# Patient Record
Sex: MALE | Race: WHITE | URBAN - METROPOLITAN AREA
[De-identification: names, ages, dates, MRNs, and addresses within clinical notes are randomized per-mention and may not be internally consistent; named-entity substitution may affect disease eponyms.]

---

## 2017-04-25 RX ORDER — SITAGLIPTIN 50 MG/1
TABLET, FILM COATED ORAL
Qty: 30 TABLET | Refills: 0 | OUTPATIENT
Start: 2017-04-25

## 2017-04-25 NOTE — TELEPHONE ENCOUNTER
Last OV:  1/4/2017. Return to clinic in 3 months  Suggested date of next visit: 04/04/2017  No future appointments.   Savanah Giraldo, 04/25/2017, 3:37 PM

## 2017-04-26 NOTE — TELEPHONE ENCOUNTER
Future Appointments  Date Time Provider Jovanni Nunez   5/6/2017 8:30 AM Pancho Day MD [facility]     Dr Andrea Cotton can you please send in a refill until appt. Thank you.

## 2017-05-06 ENCOUNTER — OFFICE VISIT (OUTPATIENT)
Dept: FAMILY MEDICINE CLINIC | Facility: CLINIC | Age: 47
End: 2017-05-06

## 2017-05-06 ENCOUNTER — APPOINTMENT (OUTPATIENT)
Dept: LAB | Age: 47
End: 2017-05-06
Attending: FAMILY MEDICINE
Payer: COMMERCIAL

## 2017-05-06 VITALS
RESPIRATION RATE: 16 BRPM | DIASTOLIC BLOOD PRESSURE: 96 MMHG | HEART RATE: 100 BPM | BODY MASS INDEX: 29.16 KG/M2 | WEIGHT: 210.63 LBS | SYSTOLIC BLOOD PRESSURE: 138 MMHG | HEIGHT: 71.25 IN | TEMPERATURE: 98 F

## 2017-05-06 DIAGNOSIS — Z87.891 QUIT SMOKING: ICD-10-CM

## 2017-05-06 DIAGNOSIS — R03.0 ELEVATED BLOOD PRESSURE READING WITHOUT DIAGNOSIS OF HYPERTENSION: ICD-10-CM

## 2017-05-06 DIAGNOSIS — E11.8 TYPE 2 DIABETES MELLITUS WITH COMPLICATION, WITHOUT LONG-TERM CURRENT USE OF INSULIN (HCC): ICD-10-CM

## 2017-05-06 DIAGNOSIS — E11.8 TYPE 2 DIABETES MELLITUS WITH COMPLICATION, WITHOUT LONG-TERM CURRENT USE OF INSULIN (HCC): Primary | ICD-10-CM

## 2017-05-06 PROBLEM — K76.0 FATTY METAMORPHOSIS OF LIVER: Status: ACTIVE | Noted: 2017-01-04

## 2017-05-06 PROBLEM — K40.90 INGUINAL HERNIA: Status: ACTIVE | Noted: 2017-01-04

## 2017-05-06 PROCEDURE — 83036 HEMOGLOBIN GLYCOSYLATED A1C: CPT

## 2017-05-06 PROCEDURE — 80053 COMPREHEN METABOLIC PANEL: CPT

## 2017-05-06 PROCEDURE — 99214 OFFICE O/P EST MOD 30 MIN: CPT | Performed by: FAMILY MEDICINE

## 2017-05-06 NOTE — PROGRESS NOTES
[facility] [place]  PROGRESS NOTE  Chief Complaint:   Patient presents with:  Diabetic Flu: 150s to 170s      HPI:   This is a 55year old male presents for diabetes follow-up. He has been tolerating both of his diabetes medication well.   His b 1 tablet (50 mg total) by mouth daily. Disp: 30 tablet Rfl: 0      Ready to quit: Not Answered  Counseling given: Yes         REVIEW OF SYSTEMS:   CONSTITUTIONAL:  Denies unusual weight gain/loss, fever, chills, or fatigue.    EYES:  Denies eye pain, visual pulses bilaterally. ABDOMEN: Soft, nondistended, nontender, bowel sounds normal in all 4 quadrants, no Masses, no hepatosplenomegaly. SKIN: No rashes, no skin lesion, no bruising, good turgor.   MUSCULOSKELETAL: Normal ROM, no joint pain, or muscle weakne Patient/Caregiver Education: There are no barriers to learning. Medical education done. Outcome: Patient verbalizes understanding. Patient is notified to call with any questions, complications, allergies, or worsening or changing symptoms.   Patient is to

## 2017-05-08 ENCOUNTER — TELEPHONE (OUTPATIENT)
Dept: FAMILY MEDICINE CLINIC | Facility: CLINIC | Age: 47
End: 2017-05-08

## 2017-05-08 NOTE — TELEPHONE ENCOUNTER
----- Message from Jennifer Garza MD sent at 5/6/2017  4:31 PM CDT -----  Please inform patient that his hemoglobin A1c has improved to 6.2. His diabetes is stable. Advised to continue with current medication and diet. CMP is also within normal limits.

## 2017-05-10 NOTE — TELEPHONE ENCOUNTER
Let pt know the following below. Pt verbalized his understanding and had no other questions at this time. Will be bringing in a urine sample this afternoon.

## 2017-05-23 NOTE — TELEPHONE ENCOUNTER
LOV: 5/6/17  Last refille: 5/6/17  Both scripts with 2 refills    Does pt need to come in? No future appointments.

## 2017-05-24 RX ORDER — SITAGLIPTIN 50 MG/1
TABLET, FILM COATED ORAL
Qty: 30 TABLET | Refills: 2 | Status: SHIPPED | OUTPATIENT
Start: 2017-05-24 | End: 2017-12-30

## 2017-08-16 RX ORDER — SITAGLIPTIN 50 MG/1
TABLET, FILM COATED ORAL
Qty: 30 TABLET | Refills: 0 | Status: SHIPPED | OUTPATIENT
Start: 2017-08-16 | End: 2017-08-26

## 2017-08-16 NOTE — TELEPHONE ENCOUNTER
Last Labs:  5/6/2017  Last OV:  5/6/2017  Last RF:  5/24/2017    Future Appointments  Date Time Provider Jovanni Nunez   8/26/2017 8:00 AM Fish Mathew MD [facility] [place] [facility], 08/16/17, 2:54 PM

## 2017-08-26 ENCOUNTER — LAB ENCOUNTER (OUTPATIENT)
Dept: LAB | Age: 47
End: 2017-08-26
Attending: FAMILY MEDICINE
Payer: COMMERCIAL

## 2017-08-26 ENCOUNTER — OFFICE VISIT (OUTPATIENT)
Dept: FAMILY MEDICINE CLINIC | Facility: CLINIC | Age: 47
End: 2017-08-26

## 2017-08-26 VITALS
RESPIRATION RATE: 20 BRPM | BODY MASS INDEX: 30 KG/M2 | WEIGHT: 218.81 LBS | HEART RATE: 100 BPM | TEMPERATURE: 98 F | SYSTOLIC BLOOD PRESSURE: 160 MMHG | DIASTOLIC BLOOD PRESSURE: 102 MMHG

## 2017-08-26 DIAGNOSIS — E11.8 TYPE 2 DIABETES MELLITUS WITH COMPLICATION, WITHOUT LONG-TERM CURRENT USE OF INSULIN (HCC): ICD-10-CM

## 2017-08-26 DIAGNOSIS — I10 ESSENTIAL HYPERTENSION: ICD-10-CM

## 2017-08-26 DIAGNOSIS — E11.8 TYPE 2 DIABETES MELLITUS WITH COMPLICATION, WITHOUT LONG-TERM CURRENT USE OF INSULIN (HCC): Primary | ICD-10-CM

## 2017-08-26 LAB
ALBUMIN SERPL-MCNC: 3.9 G/DL (ref 3.5–4.8)
ALP LIVER SERPL-CCNC: 60 U/L (ref 45–117)
ALT SERPL-CCNC: 64 U/L (ref 17–63)
AST SERPL-CCNC: 39 U/L (ref 15–41)
BASOPHILS # BLD AUTO: 0.06 X10(3) UL (ref 0–0.1)
BASOPHILS NFR BLD AUTO: 1 %
BILIRUB SERPL-MCNC: 0.7 MG/DL (ref 0.1–2)
BUN BLD-MCNC: 11 MG/DL (ref 8–20)
CALCIUM BLD-MCNC: 9.4 MG/DL (ref 8.3–10.3)
CHLORIDE: 104 MMOL/L (ref 101–111)
CHOLEST SMN-MCNC: 164 MG/DL (ref ?–200)
CO2: 27 MMOL/L (ref 22–32)
CREAT BLD-MCNC: 0.79 MG/DL (ref 0.7–1.3)
EOSINOPHIL # BLD AUTO: 0.22 X10(3) UL (ref 0–0.3)
EOSINOPHIL NFR BLD AUTO: 3.6 %
ERYTHROCYTE [DISTWIDTH] IN BLOOD BY AUTOMATED COUNT: 13.1 % (ref 11.5–16)
EST. AVERAGE GLUCOSE BLD GHB EST-MCNC: 131 MG/DL (ref 68–126)
GLUCOSE BLD-MCNC: 148 MG/DL (ref 70–99)
HBA1C MFR BLD HPLC: 6.2 % (ref ?–5.7)
HCT VFR BLD AUTO: 44.6 % (ref 37–53)
HDLC SERPL-MCNC: 55 MG/DL (ref 45–?)
HDLC SERPL: 2.98 {RATIO} (ref ?–4.97)
HGB BLD-MCNC: 15.5 G/DL (ref 13–17)
IMMATURE GRANULOCYTE COUNT: 0.04 X10(3) UL (ref 0–1)
IMMATURE GRANULOCYTE RATIO %: 0.6 %
LDLC SERPL CALC-MCNC: 96 MG/DL (ref ?–130)
LDLC SERPL-MCNC: 13 MG/DL (ref 5–40)
LYMPHOCYTES # BLD AUTO: 1.2 X10(3) UL (ref 0.9–4)
LYMPHOCYTES NFR BLD AUTO: 19.4 %
M PROTEIN MFR SERPL ELPH: 7.7 G/DL (ref 6.1–8.3)
MCH RBC QN AUTO: 33.5 PG (ref 27–33.2)
MCHC RBC AUTO-ENTMCNC: 34.8 G/DL (ref 31–37)
MCV RBC AUTO: 96.5 FL (ref 80–99)
MONOCYTES # BLD AUTO: 0.55 X10(3) UL (ref 0.1–0.6)
MONOCYTES NFR BLD AUTO: 8.9 %
NEUTROPHIL ABS PRELIM: 4.12 X10 (3) UL (ref 1.3–6.7)
NEUTROPHILS # BLD AUTO: 4.12 X10(3) UL (ref 1.3–6.7)
NEUTROPHILS NFR BLD AUTO: 66.5 %
NONHDLC SERPL-MCNC: 109 MG/DL (ref ?–130)
PLATELET # BLD AUTO: 208 10(3)UL (ref 150–450)
POTASSIUM SERPL-SCNC: 4.7 MMOL/L (ref 3.6–5.1)
RBC # BLD AUTO: 4.62 X10(6)UL (ref 4.3–5.7)
RED CELL DISTRIBUTION WIDTH-SD: 45.7 FL (ref 35.1–46.3)
SODIUM SERPL-SCNC: 137 MMOL/L (ref 136–144)
TRIGLYCERIDES: 67 MG/DL (ref ?–150)
TSI SER-ACNC: 2.47 MIU/ML (ref 0.35–5.5)
WBC # BLD AUTO: 6.2 X10(3) UL (ref 4–13)

## 2017-08-26 PROCEDURE — 36415 COLL VENOUS BLD VENIPUNCTURE: CPT

## 2017-08-26 PROCEDURE — 80053 COMPREHEN METABOLIC PANEL: CPT

## 2017-08-26 PROCEDURE — 85025 COMPLETE CBC W/AUTO DIFF WBC: CPT

## 2017-08-26 PROCEDURE — 84443 ASSAY THYROID STIM HORMONE: CPT

## 2017-08-26 PROCEDURE — 80061 LIPID PANEL: CPT

## 2017-08-26 PROCEDURE — 99214 OFFICE O/P EST MOD 30 MIN: CPT | Performed by: FAMILY MEDICINE

## 2017-08-26 PROCEDURE — 93000 ELECTROCARDIOGRAM COMPLETE: CPT | Performed by: FAMILY MEDICINE

## 2017-08-26 PROCEDURE — 83036 HEMOGLOBIN GLYCOSYLATED A1C: CPT

## 2017-08-26 RX ORDER — LISINOPRIL 20 MG/1
20 TABLET ORAL DAILY
Qty: 30 TABLET | Refills: 2 | Status: SHIPPED | OUTPATIENT
Start: 2017-08-26 | End: 2017-12-19

## 2017-08-26 NOTE — PROGRESS NOTES
[facility] [place]  PROGRESS NOTE  Chief Complaint:   Patient presents with:  Medication Follow-Up: 120s-140s      HPI:   This is a 52year old male with history of diabetes presents for follow up.  His glucose level at home has been ranging from MG Oral Tab Take 1 tablet (20 mg total) by mouth daily.  Disp: 30 tablet Rfl: 2   JANUVIA 50 MG Oral Tab TAKE 1 TABLET(50 MG) BY MOUTH DAILY Disp: 30 tablet Rfl: 2   METFORMIN HCL 1000 MG Oral Tab TAKE 1 TABLET(1000 MG) BY MOUTH TWICE DAILY Disp: 60 tablet nourished, no acute distress. EYES:  Sclera anicteric, conjunctiva normal, PERRLA, EOMI. LUNGS: Clear to auscultation bilterally, no rales/rhonchi/wheezing. HEART:  Regular rate and rhythm, S1 and S2 are normal, no murmurs, rubs or gallops.   EXTREMITIES on coffee. Advice low carb in diet, AVOID FOOD WITH HIGH GLYCEMIC INDEX, have smaller portion meal, no juice or soda, don't eat late at night, weight loss. Continue to monitor glucose level, call if glucose level less than 70 or more than 300.     Retur

## 2017-08-26 NOTE — PATIENT INSTRUCTIONS
Recommend to start lisinopril. Advice low salt diet and exercise. Monitor your blood pressure. Return to clinic if systolic blood pressure more than 846 or diastolic more than 242. Stop smoking and cut back on coffee.    Advice low carb in diet, AVOID F

## 2017-08-28 ENCOUNTER — TELEPHONE (OUTPATIENT)
Dept: FAMILY MEDICINE CLINIC | Facility: CLINIC | Age: 47
End: 2017-08-28

## 2017-08-28 NOTE — TELEPHONE ENCOUNTER
----- Message from Leandro Lambert MD sent at 8/28/2017  9:03 AM CDT -----  Please inform patient that his hemoglobin A1c level is 6.2, his glucose level is 148.   His diabetes is currently stable, continue with current medication and watching carb in his die

## 2017-09-12 RX ORDER — SITAGLIPTIN 50 MG/1
TABLET, FILM COATED ORAL
Qty: 30 TABLET | Refills: 1 | Status: SHIPPED | OUTPATIENT
Start: 2017-09-12 | End: 2017-11-03

## 2017-11-03 NOTE — TELEPHONE ENCOUNTER
Future appt:    Last Appointment:  8/26/2017    Cholesterol, Total (mg/dL)   Date Value   08/26/2017 164   ----------  HDL Cholesterol (mg/dL)   Date Value   08/26/2017 55   ----------  LDL Cholesterol (mg/dL)   Date Value   08/26/2017 96   ----------  Triglycerides (mg/dL)   Date Value   08/26/2017 67   ----------    Lab Results  Component Value Date    (H) 08/26/2017   A1C 6.2 (H) 08/26/2017       Lab Results  Component Value Date   TSH 2.470 08/26/2017       No Follow-up on file. Return in about 3 months (around 11/26/2017).

## 2017-11-04 RX ORDER — SITAGLIPTIN 50 MG/1
TABLET, FILM COATED ORAL
Qty: 30 TABLET | Refills: 0 | Status: SHIPPED | OUTPATIENT
Start: 2017-11-04 | End: 2017-12-30

## 2017-12-19 NOTE — TELEPHONE ENCOUNTER
Last appt 8/26/17    Future Appointments  Date Time Provider Jovanni Nunez   12/30/2017 8:20 AM Mirian Rod MD [facility] [place] [facility]

## 2017-12-20 RX ORDER — LISINOPRIL 20 MG/1
TABLET ORAL
Qty: 30 TABLET | Refills: 0 | Status: SHIPPED | OUTPATIENT
Start: 2017-12-20 | End: 2018-01-20

## 2017-12-30 ENCOUNTER — OFFICE VISIT (OUTPATIENT)
Dept: FAMILY MEDICINE CLINIC | Facility: CLINIC | Age: 47
End: 2017-12-30

## 2017-12-30 ENCOUNTER — APPOINTMENT (OUTPATIENT)
Dept: LAB | Age: 47
End: 2017-12-30
Attending: FAMILY MEDICINE
Payer: COMMERCIAL

## 2017-12-30 VITALS
DIASTOLIC BLOOD PRESSURE: 96 MMHG | SYSTOLIC BLOOD PRESSURE: 152 MMHG | TEMPERATURE: 98 F | BODY MASS INDEX: 30.07 KG/M2 | HEIGHT: 72 IN | HEART RATE: 80 BPM | WEIGHT: 222 LBS

## 2017-12-30 DIAGNOSIS — I10 ESSENTIAL HYPERTENSION: ICD-10-CM

## 2017-12-30 DIAGNOSIS — E11.8 DIABETIC COMPLICATION (HCC): ICD-10-CM

## 2017-12-30 DIAGNOSIS — Z00.00 PHYSICAL EXAM: ICD-10-CM

## 2017-12-30 DIAGNOSIS — E11.8 TYPE 2 DIABETES MELLITUS WITH COMPLICATION, WITHOUT LONG-TERM CURRENT USE OF INSULIN (HCC): Primary | ICD-10-CM

## 2017-12-30 DIAGNOSIS — E11.42 DIABETIC PERIPHERAL NEUROPATHY (HCC): ICD-10-CM

## 2017-12-30 PROCEDURE — 36415 COLL VENOUS BLD VENIPUNCTURE: CPT | Performed by: FAMILY MEDICINE

## 2017-12-30 PROCEDURE — 84443 ASSAY THYROID STIM HORMONE: CPT | Performed by: FAMILY MEDICINE

## 2017-12-30 PROCEDURE — 80053 COMPREHEN METABOLIC PANEL: CPT | Performed by: FAMILY MEDICINE

## 2017-12-30 PROCEDURE — 81003 URINALYSIS AUTO W/O SCOPE: CPT | Performed by: FAMILY MEDICINE

## 2017-12-30 PROCEDURE — 83036 HEMOGLOBIN GLYCOSYLATED A1C: CPT | Performed by: FAMILY MEDICINE

## 2017-12-30 PROCEDURE — 80061 LIPID PANEL: CPT | Performed by: FAMILY MEDICINE

## 2017-12-30 PROCEDURE — 82043 UR ALBUMIN QUANTITATIVE: CPT | Performed by: FAMILY MEDICINE

## 2017-12-30 PROCEDURE — 99396 PREV VISIT EST AGE 40-64: CPT | Performed by: FAMILY MEDICINE

## 2017-12-30 PROCEDURE — 82570 ASSAY OF URINE CREATININE: CPT | Performed by: FAMILY MEDICINE

## 2017-12-30 PROCEDURE — 85025 COMPLETE CBC W/AUTO DIFF WBC: CPT | Performed by: FAMILY MEDICINE

## 2017-12-30 RX ORDER — GABAPENTIN 300 MG/1
300 CAPSULE ORAL NIGHTLY
Qty: 30 CAPSULE | Refills: 2 | Status: SHIPPED | OUTPATIENT
Start: 2017-12-30 | End: 2018-03-25

## 2017-12-30 NOTE — PATIENT INSTRUCTIONS
Advice low carb in diet, AVOID FOOD WITH HIGH GLYCEMIC INDEX, have smaller portion meal, no juice or soda, don't eat late at night, exercise, weight loss. Continue to monitor glucose level, call if glucose level less than 70 or more than 300.     Advice l

## 2017-12-30 NOTE — PROGRESS NOTES
[facility]      HPI:   Freddie Cowan is a 52year old male who presents for an Annual Health Visit. Patient also has history of hypertension, diabetes. He has been tolerating medication well, not very compliant with diet.   Also has l oz/week     Comment: 2 beers per day    Social History Narrative    Single         REVIEW OF SYSTEMS:   GENERAL HEALTH: feels well otherwise   SKIN: denies any unusual skin lesions or rashes  EYES: no visual complaints or deficits  HEENT: denies nasal valdo bruits.   GENITAL/: penis normal; no testicular masses; no inguinal hernias    LYMPHATIC: no lymphadenopathy  MUSCULOSKELETAL: no acute synovitis upper or lower extremity  EXTREMITIES: no cyanosis, clubbing or edema, peripheral pulses intact  NEUROLOGIC: diabetes mellitus with complication, without long-term current use of insulin (HCC)     Essential hypertension     Diabetic peripheral neuropathy ([facility]      Megan Turner MD  12/30/2017  10:58 AM

## 2018-01-02 ENCOUNTER — TELEPHONE (OUTPATIENT)
Dept: FAMILY MEDICINE CLINIC | Facility: CLINIC | Age: 48
End: 2018-01-02

## 2018-01-02 NOTE — TELEPHONE ENCOUNTER
----- Message from Iain Pickett MD sent at 1/1/2018  9:27 PM CST -----  Please inform patient that his HgA1c level is 6.2, his diabetes is stable. Recommend to continue with low carb diet and exercise.    His liver enzymes are slightly elevated, recommend

## 2018-01-22 RX ORDER — LISINOPRIL 20 MG/1
TABLET ORAL
Qty: 30 TABLET | Refills: 3 | Status: SHIPPED | OUTPATIENT
Start: 2018-01-22 | End: 2018-06-01

## 2018-01-22 NOTE — TELEPHONE ENCOUNTER
Future appt:    Last Appointment:  12/30/2017 with Dr. Micah Mckay for DM / HTN / and other health issues  Return in 3 months    Cholesterol, Total (mg/dL)   Date Value   12/30/2017 185   ----------  HDL Cholesterol (mg/dL)   Date Value   12/30/2017 61   ------

## 2018-03-26 RX ORDER — SITAGLIPTIN 50 MG/1
TABLET, FILM COATED ORAL
Qty: 30 TABLET | Refills: 0 | Status: SHIPPED | OUTPATIENT
Start: 2018-03-26 | End: 2018-06-01

## 2018-03-26 RX ORDER — GABAPENTIN 300 MG/1
CAPSULE ORAL
Qty: 30 CAPSULE | Refills: 0 | Status: SHIPPED | OUTPATIENT
Start: 2018-03-26 | End: 2018-06-01

## 2018-04-23 RX ORDER — GABAPENTIN 300 MG/1
CAPSULE ORAL
Qty: 30 CAPSULE | Refills: 0 | OUTPATIENT
Start: 2018-04-23

## 2018-04-23 RX ORDER — SITAGLIPTIN 50 MG/1
TABLET, FILM COATED ORAL
Qty: 30 TABLET | Refills: 0 | OUTPATIENT
Start: 2018-04-23

## 2018-05-21 RX ORDER — LISINOPRIL 20 MG/1
TABLET ORAL
Qty: 30 TABLET | Refills: 0 | OUTPATIENT
Start: 2018-05-21

## 2018-06-01 ENCOUNTER — TELEPHONE (OUTPATIENT)
Dept: FAMILY MEDICINE CLINIC | Facility: CLINIC | Age: 48
End: 2018-06-01

## 2018-06-01 RX ORDER — LISINOPRIL 20 MG/1
TABLET ORAL
Qty: 30 TABLET | Refills: 0 | Status: SHIPPED | OUTPATIENT
Start: 2018-06-01 | End: 2018-06-27

## 2018-06-01 RX ORDER — GABAPENTIN 300 MG/1
CAPSULE ORAL
Qty: 30 CAPSULE | Refills: 0 | Status: SHIPPED | OUTPATIENT
Start: 2018-06-01 | End: 2018-06-27

## 2018-06-01 NOTE — TELEPHONE ENCOUNTER
Future Appointments  Date Time Provider Jovanni Mayra   6/9/2018 8:20 AM Milan Munoz MD [facility] [place] [facility] [place]      Return in about 3 months (around 3/30/2018).

## 2018-06-01 NOTE — TELEPHONE ENCOUNTER
Pt states that metformin, lisinopril, gabapentin, genuvia prescriptions need to be refilled. Pt scheduled to see Dr Riana Swain on 6/9.

## 2018-06-09 ENCOUNTER — OFFICE VISIT (OUTPATIENT)
Dept: FAMILY MEDICINE CLINIC | Facility: CLINIC | Age: 48
End: 2018-06-09

## 2018-06-09 VITALS
DIASTOLIC BLOOD PRESSURE: 92 MMHG | HEART RATE: 102 BPM | RESPIRATION RATE: 16 BRPM | HEIGHT: 72 IN | BODY MASS INDEX: 30.1 KG/M2 | TEMPERATURE: 99 F | WEIGHT: 222.25 LBS | SYSTOLIC BLOOD PRESSURE: 148 MMHG

## 2018-06-09 DIAGNOSIS — I10 ESSENTIAL HYPERTENSION: ICD-10-CM

## 2018-06-09 DIAGNOSIS — E11.8 TYPE 2 DIABETES MELLITUS WITH COMPLICATION, WITHOUT LONG-TERM CURRENT USE OF INSULIN (HCC): Primary | ICD-10-CM

## 2018-06-09 DIAGNOSIS — E11.42 DIABETIC PERIPHERAL NEUROPATHY (HCC): ICD-10-CM

## 2018-06-09 PROBLEM — R03.0 ELEVATED BLOOD PRESSURE READING WITHOUT DIAGNOSIS OF HYPERTENSION: Status: RESOLVED | Noted: 2017-05-06 | Resolved: 2018-06-09

## 2018-06-09 PROCEDURE — 99214 OFFICE O/P EST MOD 30 MIN: CPT | Performed by: FAMILY MEDICINE

## 2018-06-09 RX ORDER — HYDROCHLOROTHIAZIDE 12.5 MG/1
12.5 TABLET ORAL EVERY MORNING
Qty: 30 TABLET | Refills: 2 | Status: SHIPPED | OUTPATIENT
Start: 2018-06-09 | End: 2018-08-29

## 2018-06-09 NOTE — PROGRESS NOTES
[address]  PROGRESS NOTE  Chief Complaint:   Patient presents with: Follow - Up      HPI:   This is a 52year old male with history of diabetes, hypertension and diabetic peripheral neuropathy presents for follow-up.   Patient has been mg/dl   Bilirubin Urine Negative Negative   Ketones Urine Negative Negative mg/dL   Blood Urine Negative Negative   pH Urine 5.0 4.5 - 8.0   Protein Urine Negative Negative mg/dl   Urobilinogen Urine <2.0 0.2 - 2.0 mg/dL   Nitrite Urine Negative Negative Current Meds:    Current Outpatient Prescriptions:  hydrochlorothiazide 12.5 MG Oral Tab Take 1 tablet (12.5 mg total) by mouth every morning.  Disp: 30 tablet Rfl: 2   SITagliptin Phosphate (JANUVIA) 50 MG Oral Tab TAKE 1 TABLET(50 MG) BY MOUTH DAILY Dis Estimated body mass index is 30.14 kg/m² as calculated from the following:    Height as of this encounter: 72\". Weight as of this encounter: 222 lb 4 oz. Vital signs reviewed.   Physical Exam:  GEN:  Patient is alert, awake and oriented, well develope Advice low carb in diet, AVOID FOOD WITH HIGH GLYCEMIC INDEX, have smaller portion meal, no juice or soda, don't eat late at night, exercise, weight loss. Check labs at [facility].   Also recommend to see podiatrist for shoe insert.          Talisha Howell 054

## 2018-06-09 NOTE — PATIENT INSTRUCTIONS
Continue current medications   Start HCTZ in morning and lisinopril in evening. Advice low salt diet and exercise. Monitor your blood pressure. Return to clinic if systolic blood pressure more than 979 or diastolic more than 745.    Advice low carb in die

## 2018-06-11 ENCOUNTER — TELEPHONE (OUTPATIENT)
Dept: FAMILY MEDICINE CLINIC | Facility: CLINIC | Age: 48
End: 2018-06-11

## 2018-06-11 NOTE — TELEPHONE ENCOUNTER
Patient notified of lab results and Dr's instructions. Appointment scheduled with Dr. Christa Fernandez for September.

## 2018-06-11 NOTE — TELEPHONE ENCOUNTER
----- Message from Mayra Cisneros MD sent at 6/11/2018  8:42 AM CDT -----  Please inform patient that his hemoglobin A1c level is 6.8 which has gone up from 6.2 last time. He needs we will better control of his diabetes.   Recommend to watch carb very close

## 2018-06-27 RX ORDER — GABAPENTIN 300 MG/1
CAPSULE ORAL
Qty: 90 CAPSULE | Refills: 0 | Status: SHIPPED | OUTPATIENT
Start: 2018-06-27 | End: 2018-08-29

## 2018-06-27 RX ORDER — SITAGLIPTIN 50 MG/1
TABLET, FILM COATED ORAL
Qty: 90 TABLET | Refills: 0 | Status: SHIPPED | OUTPATIENT
Start: 2018-06-27 | End: 2018-09-26

## 2018-06-27 RX ORDER — LISINOPRIL 20 MG/1
TABLET ORAL
Qty: 90 TABLET | Refills: 0 | Status: SHIPPED | OUTPATIENT
Start: 2018-06-27 | End: 2018-09-26

## 2018-06-27 NOTE — TELEPHONE ENCOUNTER
Future Appointments  Date Time Provider Jovanni Mayra   9/15/2018 8:00 AM Claudell Public, MD [facility] [place] [facility] [place]      Return in about 3 months (around 9/9/2018).

## 2018-06-27 NOTE — TELEPHONE ENCOUNTER
Future Appointments  Date Time Provider [facility]ra   9/15/2018 8:00 AM Lucy Hill MD [facility] [place] [facility] [place]      Return in about 3 months (around 9/9/2018).

## 2018-08-29 RX ORDER — HYDROCHLOROTHIAZIDE 12.5 MG/1
TABLET ORAL
Qty: 30 TABLET | Refills: 0 | Status: SHIPPED | OUTPATIENT
Start: 2018-08-29 | End: 2018-09-26

## 2018-08-29 RX ORDER — GABAPENTIN 300 MG/1
CAPSULE ORAL
Qty: 90 CAPSULE | Refills: 0 | Status: SHIPPED | OUTPATIENT
Start: 2018-08-29 | End: 2019-01-30

## 2018-08-29 NOTE — TELEPHONE ENCOUNTER
Future Appointments  Date Time Provider Jovanni Mayra   9/15/2018 8:00 AM Arturo Shanks MD [facility] [place] [facility] [place]      Return in about 3 months (around 9/9/2018).

## 2018-08-29 NOTE — TELEPHONE ENCOUNTER
Future appt:     Your appointments     Date & Time Appointment Department [facility])    Sep 15, 2018  8:00 AM CDT Exam - Established Patient with Rishabh Shultz MD [address] [place] [address] ([facility])        Ble Mcghee

## 2018-09-15 ENCOUNTER — OFFICE VISIT (OUTPATIENT)
Dept: FAMILY MEDICINE CLINIC | Facility: CLINIC | Age: 48
End: 2018-09-15
Payer: COMMERCIAL

## 2018-09-15 VITALS
OXYGEN SATURATION: 99 % | RESPIRATION RATE: 20 BRPM | BODY MASS INDEX: 30.34 KG/M2 | TEMPERATURE: 97 F | WEIGHT: 224 LBS | SYSTOLIC BLOOD PRESSURE: 124 MMHG | HEIGHT: 72 IN | DIASTOLIC BLOOD PRESSURE: 86 MMHG | HEART RATE: 91 BPM

## 2018-09-15 DIAGNOSIS — E11.8 TYPE 2 DIABETES MELLITUS WITH COMPLICATION, WITHOUT LONG-TERM CURRENT USE OF INSULIN (HCC): Primary | ICD-10-CM

## 2018-09-15 DIAGNOSIS — I10 ESSENTIAL HYPERTENSION: ICD-10-CM

## 2018-09-15 PROCEDURE — 99214 OFFICE O/P EST MOD 30 MIN: CPT | Performed by: FAMILY MEDICINE

## 2018-09-15 NOTE — PROGRESS NOTES
[facility] [place]  PROGRESS NOTE  Chief Complaint:   Patient presents with:  Medication Follow-Up      HPI:   This is a 50year old male with history of hypertension and diabetes presents for follow-up.     Has  been compliant with taking medica METFORMIN HCL 1000 MG Oral Tab TAKE 1 TABLET(1000 MG) BY MOUTH TWICE DAILY Disp: 180 tablet Rfl: 0   Glucose Blood (ACCU-CHEK SMARTVIEW) In Vitro Strip As directed  Disp:  Rfl:       Ready to quit: No  Counseling given: Yes         REVIEW OF SYSTEMS:   CON HEART:  Regular rate and rhythm, S1 and S2 are normal, no murmurs, rubs or gallops. EXTREMITIES: No edema, no cyanosis, no clubbing, FROM, 2+ dorsalis pedis pulses bilaterally.   ABDOMEN: Soft, nondistended, nontender, bowel sounds normal in all 4 quadrant Carbohydrates are the main source of fuel for the body. Carbohydrates raise blood sugar. Many people think carbohydrates are only found in pasta or bread.  But carbohydrates are actually in many kinds of foods:  · Sugars occur naturally in foods such as fru · Saturated fats are found in animal products, such as meat, poultry, whole milk, lard, and butter. Saturated fats raise LDL cholesterol and are not healthy for your heart.   · Hydrogenated oils and trans fats are formed when vegetable oils are processed in Fatty metamorphosis of liver     Inguinal hernia     Type 2 diabetes mellitus with complication, without long-term current use of insulin (HCC)     Essential hypertension     Diabetic peripheral neuropathy (HCC)      Megan Turner MD

## 2018-09-15 NOTE — PATIENT INSTRUCTIONS
Continue current medications  Advice low carb in diet, AVOID FOOD WITH HIGH GLYCEMIC INDEX, have smaller portion meal, no juice or soda, don't eat late at night, exercise, weight loss.    Continue to monitor glucose level, call if glucose level less than 70 it can raise blood cholesterol, increasing the risk of heart disease. Fat is also high in calories, which can cause weight gain. Not all types of fat are the same.   More Healthy:  · Monounsaturated fats are mostly found in vegetable oils, such as olive, [place] [address]. All rights reserved. This information is not intended as a substitute for professional medical care. Always follow your healthcare professional's instructions.

## 2018-09-16 LAB
ALBUMIN/GLOBULIN RATIO: 1.5 (CALC) (ref 1–2.5)
ALBUMIN: 4.5 G/DL (ref 3.6–5.1)
ALKALINE PHOSPHATASE: 56 U/L (ref 40–115)
ALT: 64 U/L (ref 9–46)
AST: 48 U/L (ref 10–40)
BILIRUBIN, TOTAL: 0.8 MG/DL (ref 0.2–1.2)
BUN: 12 MG/DL (ref 7–25)
CALCIUM: 9.9 MG/DL (ref 8.6–10.3)
CARBON DIOXIDE: 24 MMOL/L (ref 20–32)
CHLORIDE: 99 MMOL/L (ref 98–110)
CREATININE, RANDOM URINE: 115 MG/DL (ref 20–320)
CREATININE: 0.81 MG/DL (ref 0.6–1.35)
EGFR IF AFRICN AM: 122 ML/MIN/1.73M2
EGFR IF NONAFRICN AM: 105 ML/MIN/1.73M2
GLOBULIN: 3 G/DL (CALC) (ref 1.9–3.7)
GLUCOSE: 165 MG/DL (ref 65–99)
HEMOGLOBIN A1C: 7.3 % OF TOTAL HGB
MICROALBUMIN/CREATININE RATIO, RANDOM URINE: 6 MCG/MG CREAT
MICROALBUMIN: 0.7 MG/DL
POTASSIUM: 4.3 MMOL/L (ref 3.5–5.3)
PROTEIN, TOTAL: 7.5 G/DL (ref 6.1–8.1)
SODIUM: 134 MMOL/L (ref 135–146)

## 2018-09-17 ENCOUNTER — TELEPHONE (OUTPATIENT)
Dept: FAMILY MEDICINE CLINIC | Facility: CLINIC | Age: 48
End: 2018-09-17

## 2018-09-17 NOTE — TELEPHONE ENCOUNTER
I needs to see him also, patient has Kaya Souza, will order labs when he comes in. [facility]

## 2018-09-17 NOTE — TELEPHONE ENCOUNTER
Patient has already set up appt. No other questions at this time.    Future Appointments   Date Time Provider Jovanni Nunez   12/22/2018  8:20 AM Yadi Mesa MD [facility] [place] [facility]

## 2018-09-17 NOTE — TELEPHONE ENCOUNTER
Patient notified of lab results and Dr. Aldair Francois instructions. Fasting lab appt scheduled in 3 months. Dr. Mark Jones did you need to see him in the office in 3 months or just a lab appointment?

## 2018-09-17 NOTE — TELEPHONE ENCOUNTER
----- Message from Avtar Hood MD sent at 9/17/2018 10:06 AM CDT -----  Please inform patient that his hemoglobin A1c is 7.3 which has increased slightly from last time which was 6.8.   Recommend slightly better control of his diabetes, watch carb closely

## 2018-09-26 RX ORDER — HYDROCHLOROTHIAZIDE 12.5 MG/1
TABLET ORAL
Qty: 30 TABLET | Refills: 2 | Status: SHIPPED | OUTPATIENT
Start: 2018-09-26 | End: 2019-01-30

## 2018-09-26 RX ORDER — SITAGLIPTIN 50 MG/1
TABLET, FILM COATED ORAL
Qty: 90 TABLET | Refills: 0 | Status: SHIPPED | OUTPATIENT
Start: 2018-09-26 | End: 2019-01-30

## 2018-09-26 RX ORDER — LISINOPRIL 20 MG/1
TABLET ORAL
Qty: 90 TABLET | Refills: 0 | Status: SHIPPED | OUTPATIENT
Start: 2018-09-26 | End: 2019-01-30

## 2018-09-26 NOTE — TELEPHONE ENCOUNTER
Future appt:     Your appointments     Date & Time Appointment Department [facility])    Dec 22, 2018  8:20 AM CST Follow up with Ally Jade MD [address] [facility], [place] ([facility])        [address] [facility]

## 2018-09-26 NOTE — TELEPHONE ENCOUNTER
Future Appointments   Date Time Provider Jovanni Mayra   12/22/2018  8:20 AM Jac Lama MD [facility] [place] [facility] [place]      Return in about 3 months (around 12/15/2018) for follow up.

## 2018-10-23 RX ORDER — HYDROCHLOROTHIAZIDE 12.5 MG/1
TABLET ORAL
Qty: 30 TABLET | Refills: 0 | OUTPATIENT
Start: 2018-10-23

## 2018-10-23 NOTE — TELEPHONE ENCOUNTER
Received e-scribe refill request for hydrochlorothiazide. Dr. Narvaez Prior prescribed it 9/26/18 for #30 with 2 refills. Pt should not need refill at this time. Request denied with the above notation.

## 2018-12-23 NOTE — PATIENT INSTRUCTIONS
Continue current medications   Needs better control of diabetes. Advice low salt diet and exercise. Monitor your blood pressure. Return to clinic if systolic blood pressure more than 707 or diastolic more than 90. Cut back on coffee.    Discuss quit smo Airway patent,

## 2019-02-04 NOTE — TELEPHONE ENCOUNTER
Patient is due for physical and follow up appointment. Recommend to schedule appointment with me for refill. Med refill declined.

## 2019-02-04 NOTE — TELEPHONE ENCOUNTER
Future appt:     Last Appointment:  9/15/2018; Return in about 3 months (around 12/15/2018) for follow up.      Cholesterol, Total (mg/dL)   Date Value   12/30/2017 185     HDL Cholesterol (mg/dL)   Date Value   12/30/2017 61     LDL Cholesterol (mg/dL)   D

## 2019-02-06 RX ORDER — HYDROCHLOROTHIAZIDE 12.5 MG/1
TABLET ORAL
Qty: 90 TABLET | Refills: 0 | Status: SHIPPED | OUTPATIENT
Start: 2019-02-06 | End: 2019-05-22

## 2019-02-06 RX ORDER — SITAGLIPTIN 50 MG/1
TABLET, FILM COATED ORAL
Qty: 90 TABLET | Refills: 0 | Status: SHIPPED | OUTPATIENT
Start: 2019-02-06 | End: 2019-06-01

## 2019-02-06 RX ORDER — GABAPENTIN 300 MG/1
CAPSULE ORAL
Qty: 90 CAPSULE | Refills: 0 | Status: SHIPPED | OUTPATIENT
Start: 2019-02-06 | End: 2019-09-21

## 2019-02-06 RX ORDER — LISINOPRIL 20 MG/1
TABLET ORAL
Qty: 90 TABLET | Refills: 0 | Status: SHIPPED | OUTPATIENT
Start: 2019-02-06 | End: 2020-03-18

## 2019-02-06 NOTE — TELEPHONE ENCOUNTER
Pt notified that he is overdue for an appt. Pt scheduled appt but does not have enough meds until the appt. Please advise refills.     Future Appointments   Date Time Provider Jovnani Nunez   2/23/2019  8:20 AM Olive Marcus MD [facility] [place] [facility] [place]

## 2019-02-23 ENCOUNTER — OFFICE VISIT (OUTPATIENT)
Dept: FAMILY MEDICINE CLINIC | Facility: CLINIC | Age: 49
End: 2019-02-23
Payer: COMMERCIAL

## 2019-02-23 VITALS
HEIGHT: 72 IN | HEART RATE: 98 BPM | WEIGHT: 224.81 LBS | DIASTOLIC BLOOD PRESSURE: 78 MMHG | RESPIRATION RATE: 16 BRPM | BODY MASS INDEX: 30.45 KG/M2 | TEMPERATURE: 98 F | SYSTOLIC BLOOD PRESSURE: 118 MMHG | OXYGEN SATURATION: 100 %

## 2019-02-23 DIAGNOSIS — E11.8 TYPE 2 DIABETES MELLITUS WITH COMPLICATION, WITHOUT LONG-TERM CURRENT USE OF INSULIN (HCC): Primary | ICD-10-CM

## 2019-02-23 DIAGNOSIS — I10 ESSENTIAL HYPERTENSION: ICD-10-CM

## 2019-02-23 PROCEDURE — 99214 OFFICE O/P EST MOD 30 MIN: CPT | Performed by: FAMILY MEDICINE

## 2019-02-23 NOTE — PROGRESS NOTES
[address]  PROGRESS NOTE  Chief Complaint:   Patient presents with: Follow - Up: med check      HPI:   This is a 50year old male with hx of diabetes and htn present for follow up.      Has  been compliant with taking medications on reg Disp: 180 tablet Rfl: 0   Glucose Blood (ACCU-CHEK SMARTVIEW) In Vitro Strip As directed  Disp:  Rfl:       Ready to quit: Not Answered  Counseling given: Not Answered         REVIEW OF SYSTEMS:   CONSTITUTIONAL:  Denies unusual weight gain/loss, fever, ch gallops. EXTREMITIES: No edema, no cyanosis, no clubbing, FROM, 2+ dorsalis pedis pulses bilaterally. ABDOMEN: Soft, nondistended, nontender, bowel sounds normal in all 4 quadrants, no Masses, no hepatosplenomegaly.   SKIN: No rashes, no skin lesion, no b complications, allergies, or worsening or changing symptoms. Patient is to call with any side effects or complications from the treatments as a result of today.      Problem List:  Patient Active Problem List:     Type II diabetes mellitus ([facility]     Fatty

## 2019-05-23 RX ORDER — GABAPENTIN 300 MG/1
CAPSULE ORAL
Qty: 90 CAPSULE | Refills: 0 | Status: SHIPPED | OUTPATIENT
Start: 2019-05-23 | End: 2019-06-01

## 2019-05-23 RX ORDER — HYDROCHLOROTHIAZIDE 12.5 MG/1
TABLET ORAL
Qty: 90 TABLET | Refills: 0 | Status: SHIPPED | OUTPATIENT
Start: 2019-05-23 | End: 2019-08-11

## 2019-05-23 RX ORDER — SITAGLIPTIN 50 MG/1
TABLET, FILM COATED ORAL
Qty: 90 TABLET | Refills: 0 | Status: SHIPPED | OUTPATIENT
Start: 2019-05-23 | End: 2019-06-01

## 2019-05-23 RX ORDER — LISINOPRIL 20 MG/1
TABLET ORAL
Qty: 90 TABLET | Refills: 0 | Status: SHIPPED | OUTPATIENT
Start: 2019-05-23 | End: 2019-06-01

## 2019-05-23 NOTE — TELEPHONE ENCOUNTER
Future Appointments   Date Time Provider Jovanni Mayra   6/1/2019  8:20 AM Vicki Tabor MD [facility] [place] [facility] [place]      Return in about 3 months (around 5/23/2019) for physical.

## 2019-06-01 ENCOUNTER — OFFICE VISIT (OUTPATIENT)
Dept: FAMILY MEDICINE CLINIC | Facility: CLINIC | Age: 49
End: 2019-06-01
Payer: COMMERCIAL

## 2019-06-01 VITALS
TEMPERATURE: 98 F | BODY MASS INDEX: 29.97 KG/M2 | HEIGHT: 72 IN | SYSTOLIC BLOOD PRESSURE: 120 MMHG | WEIGHT: 221.25 LBS | RESPIRATION RATE: 18 BRPM | DIASTOLIC BLOOD PRESSURE: 82 MMHG | HEART RATE: 102 BPM

## 2019-06-01 DIAGNOSIS — E11.8 TYPE 2 DIABETES MELLITUS WITH COMPLICATION, WITHOUT LONG-TERM CURRENT USE OF INSULIN (HCC): ICD-10-CM

## 2019-06-01 DIAGNOSIS — Z00.00 PHYSICAL EXAM: Primary | ICD-10-CM

## 2019-06-01 DIAGNOSIS — I10 ESSENTIAL HYPERTENSION: ICD-10-CM

## 2019-06-01 PROCEDURE — 99396 PREV VISIT EST AGE 40-64: CPT | Performed by: FAMILY MEDICINE

## 2019-06-01 NOTE — PATIENT INSTRUCTIONS
Continue current medications  Increase januvia to twice a day. Advice low carb in diet, AVOID FOOD WITH HIGH GLYCEMIC INDEX, have smaller portion meal, no juice or soda, don't eat late at night, exercise, weight loss.    Continue to monitor glucose level,

## 2019-06-01 NOTE — PROGRESS NOTES
[address]    Chief Complaint:   Patient presents with:  Physical      HPI:   Kj Youssef is a 50year old male who presents for an Annual Health Visit. Patient has history of diabetes and hypertension.   He has not been watching car or cough   CARDIOVASCULAR: denies chest pain or PALMER; no palpitations   GI: denies nausea, vomiting, constipation, diarrhea; no rectal bleeding; no heartburn  GENITAL/: no dysuria, urgency or frequency; no epididymal or testicular pain; no penile discharg grossly intact; reflexes normal  PSYCHIATRIC: alert and oriented x 3; affect appropriate        ASSESSMENT AND PLAN:   ARIES was seen today for physical.    Diagnoses and all orders for this visit:    Physical exam  -     CBC WITH DIFFERENTIAL WITH PLATELET

## 2019-06-06 ENCOUNTER — TELEPHONE (OUTPATIENT)
Dept: FAMILY MEDICINE CLINIC | Facility: CLINIC | Age: 49
End: 2019-06-06

## 2019-06-06 NOTE — TELEPHONE ENCOUNTER
Plan does not cover medication prescribed. Per Rx benefit plan alternative medications include 100 mg tab. Januvia 100 mg tablets once daily will be covered instead of Januvia 50 mg tablets take one tablet by mouth twice daily.

## 2019-06-12 NOTE — TELEPHONE ENCOUNTER
Future appt:     Last Appointment:  6/1/2019; Return in about 3 months (around 9/1/2019) for follow up.      Cholesterol, Total (mg/dL)   Date Value   12/30/2017 185     HDL Cholesterol (mg/dL)   Date Value   12/30/2017 61     LDL Cholesterol (mg/dL)   Date

## 2019-07-17 RX ORDER — SITAGLIPTIN 100 MG/1
TABLET, FILM COATED ORAL
Qty: 30 TABLET | Refills: 1 | Status: SHIPPED | OUTPATIENT
Start: 2019-07-17 | End: 2019-09-15

## 2019-08-12 RX ORDER — GABAPENTIN 300 MG/1
CAPSULE ORAL
Qty: 90 CAPSULE | Refills: 0 | Status: SHIPPED | OUTPATIENT
Start: 2019-08-12 | End: 2019-11-17

## 2019-08-12 RX ORDER — LISINOPRIL 20 MG/1
TABLET ORAL
Qty: 90 TABLET | Refills: 0 | Status: SHIPPED | OUTPATIENT
Start: 2019-08-12 | End: 2019-09-21

## 2019-08-12 RX ORDER — HYDROCHLOROTHIAZIDE 12.5 MG/1
TABLET ORAL
Qty: 90 TABLET | Refills: 0 | Status: SHIPPED | OUTPATIENT
Start: 2019-08-12 | End: 2019-11-17

## 2019-08-12 NOTE — TELEPHONE ENCOUNTER
Last refills 2/6/19-7/17/19  No future appointments. Last appt 6/1/19    Instructions         Return in about 3 months (around 9/1/2019) for follow up. Continue current medications  Increase januvia to twice a day.   Advice low carb in diet, AVOID FOOD W

## 2019-08-12 NOTE — TELEPHONE ENCOUNTER
Refills sent. Please call pharmacy and remove 50mg tabs. New script sent for 100mg once a day by Dr. Jenni Stoner.

## 2019-08-30 ENCOUNTER — HOSPITAL (OUTPATIENT)
Dept: OTHER | Age: 49
End: 2019-08-30
Attending: NURSE PRACTITIONER

## 2019-09-13 ENCOUNTER — TELEPHONE (OUTPATIENT)
Dept: FAMILY MEDICINE CLINIC | Facility: CLINIC | Age: 49
End: 2019-09-13

## 2019-09-13 DIAGNOSIS — Z00.00 PHYSICAL EXAM: Primary | ICD-10-CM

## 2019-09-13 NOTE — TELEPHONE ENCOUNTER
Future Appointments   Date Time Provider Jovanni Mayra   9/21/2019 10:40 AM Mialn Munoz MD [facility] [place] [facility]     Dr Graeme Pitt can you please advise on lab orders.

## 2019-09-13 NOTE — TELEPHONE ENCOUNTER
Patient would like Dr to [facility], patient made appointment with Dr Alton Crockett 9/21 and would like to have the fasting labs prior to his appointment w/ Dr Alton Crockett.

## 2019-09-16 NOTE — TELEPHONE ENCOUNTER
Last refills 6/12/19, 7/17/19  Future appt:     Your appointments     Date & Time Appointment Department [facility])    Sep 21, 2019 10:40 AM CDT Follow up with Ally Jade MD [address] [place] [address], Dimitri Elder ([facility])

## 2019-09-17 RX ORDER — SITAGLIPTIN 100 MG/1
TABLET, FILM COATED ORAL
Qty: 90 TABLET | Refills: 0 | Status: SHIPPED | OUTPATIENT
Start: 2019-09-17 | End: 2019-12-26

## 2019-09-20 LAB
ABSOLUTE BASOPHILS: 84 CELLS/UL (ref 0–200)
ABSOLUTE EOSINOPHILS: 289 CELLS/UL (ref 15–500)
ABSOLUTE LYMPHOCYTES: 1642 CELLS/UL (ref 850–3900)
ABSOLUTE MONOCYTES: 684 CELLS/UL (ref 200–950)
ABSOLUTE NEUTROPHILS: 4902 CELLS/UL (ref 1500–7800)
ALBUMIN/GLOBULIN RATIO: 1.8 (CALC) (ref 1–2.5)
ALBUMIN: 4.6 G/DL (ref 3.6–5.1)
ALKALINE PHOSPHATASE: 67 U/L (ref 40–115)
ALT: 48 U/L (ref 9–46)
APPEARANCE: CLEAR
AST: 27 U/L (ref 10–40)
BASOPHILS: 1.1 %
BILIRUBIN, TOTAL: 0.7 MG/DL (ref 0.2–1.2)
BILIRUBIN: NEGATIVE
BUN: 14 MG/DL (ref 7–25)
CALCIUM: 10 MG/DL (ref 8.6–10.3)
CARBON DIOXIDE: 29 MMOL/L (ref 20–32)
CHLORIDE: 98 MMOL/L (ref 98–110)
CHOL/HDLC RATIO: 3.5 (CALC)
CHOLESTEROL, TOTAL: 156 MG/DL
COLOR: YELLOW
CREATININE: 0.89 MG/DL (ref 0.6–1.35)
EGFR IF AFRICN AM: 116 ML/MIN/1.73M2
EGFR IF NONAFRICN AM: 100 ML/MIN/1.73M2
EOSINOPHILS: 3.8 %
GLOBULIN: 2.6 G/DL (CALC) (ref 1.9–3.7)
GLUCOSE: 198 MG/DL (ref 65–99)
HDL CHOLESTEROL: 45 MG/DL
HEMATOCRIT: 39.5 % (ref 38.5–50)
HEMOGLOBIN A1C: 8.1 % OF TOTAL HGB
HEMOGLOBIN: 14.2 G/DL (ref 13.2–17.1)
KETONES: NEGATIVE
LDL-CHOLESTEROL: 91 MG/DL (CALC)
LEUKOCYTE ESTERASE: NEGATIVE
LYMPHOCYTES: 21.6 %
MCH: 34.8 PG (ref 27–33)
MCHC: 35.9 G/DL (ref 32–36)
MCV: 96.8 FL (ref 80–100)
MONOCYTES: 9 %
MPV: 10.4 FL (ref 7.5–12.5)
NEUTROPHILS: 64.5 %
NITRITE: NEGATIVE
NON-HDL CHOLESTEROL: 111 MG/DL (CALC)
OCCULT BLOOD: NEGATIVE
PH: 8 (ref 5–8)
PLATELET COUNT: 200 THOUSAND/UL (ref 140–400)
POTASSIUM: 4.1 MMOL/L (ref 3.5–5.3)
PROTEIN, TOTAL: 7.2 G/DL (ref 6.1–8.1)
PSA, TOTAL: 0.4 NG/ML
RDW: 11.9 % (ref 11–15)
RED BLOOD CELL COUNT: 4.08 MILLION/UL (ref 4.2–5.8)
SODIUM: 134 MMOL/L (ref 135–146)
SPECIFIC GRAVITY: 1.02 (ref 1–1.03)
TRIGLYCERIDES: 102 MG/DL
TSH W/REFLEX TO FT4: 2.46 MIU/L (ref 0.4–4.5)
WHITE BLOOD CELL COUNT: 7.6 THOUSAND/UL (ref 3.8–10.8)

## 2019-09-21 ENCOUNTER — OFFICE VISIT (OUTPATIENT)
Dept: FAMILY MEDICINE CLINIC | Facility: CLINIC | Age: 49
End: 2019-09-21
Payer: COMMERCIAL

## 2019-09-21 VITALS
BODY MASS INDEX: 30 KG/M2 | HEART RATE: 90 BPM | SYSTOLIC BLOOD PRESSURE: 138 MMHG | RESPIRATION RATE: 14 BRPM | TEMPERATURE: 98 F | WEIGHT: 221 LBS | DIASTOLIC BLOOD PRESSURE: 88 MMHG

## 2019-09-21 DIAGNOSIS — M54.2 NECK PAIN ON RIGHT SIDE: ICD-10-CM

## 2019-09-21 DIAGNOSIS — E11.8 TYPE 2 DIABETES MELLITUS WITH COMPLICATION, WITHOUT LONG-TERM CURRENT USE OF INSULIN (HCC): Primary | ICD-10-CM

## 2019-09-21 DIAGNOSIS — I10 ESSENTIAL HYPERTENSION: ICD-10-CM

## 2019-09-21 PROCEDURE — 99214 OFFICE O/P EST MOD 30 MIN: CPT | Performed by: FAMILY MEDICINE

## 2019-09-21 RX ORDER — CYCLOBENZAPRINE HCL 5 MG
5 TABLET ORAL 3 TIMES DAILY PRN
Qty: 20 TABLET | Refills: 0 | Status: SHIPPED | OUTPATIENT
Start: 2019-09-21 | End: 2020-03-18

## 2019-09-21 RX ORDER — GLIMEPIRIDE 1 MG/1
1 TABLET ORAL 2 TIMES DAILY
Qty: 60 TABLET | Refills: 2 | Status: SHIPPED | OUTPATIENT
Start: 2019-09-21 | End: 2020-01-02

## 2019-09-21 NOTE — PROGRESS NOTES
[address]  PROGRESS NOTE  Chief Complaint:   Patient presents with: Follow - Up  Diabetes  Hypertension      HPI:   This is a 52year old male with history of diabetes type 2, hypertension presents for follow-up.   Patient has also been Tab Take 1 tablet (5 mg total) by mouth 3 (three) times daily as needed for Muscle spasms.  Disp: 20 tablet Rfl: 0   METFORMIN HCL 1000 MG Oral Tab TAKE 1 TABLET(1000 MG) BY MOUTH TWICE DAILY Disp: 180 tablet Rfl: 0   JANUVIA 100 MG Oral Tab TAKE 1 TABLET(1 reviewed. Physical Exam:  GEN:  Patient is alert, awake and oriented, well developed, well nourished, no acute distress. EYES:  Sclera anicteric, conjunctiva normal, PERRLA, EOMI. NECK: Supple, no carotid bruit, no JVD, no thyromegaly.   Has full range INDEX, have smaller portion meal, no juice or soda, don't eat late at night, exercise, weight loss. Continue to monitor glucose level, call if glucose level less than 70 or more than 300. Advice low salt diet and exercise. Monitor your blood pressure.

## 2019-09-21 NOTE — PATIENT INSTRUCTIONS
Advice rest, heating pad, aleve or ibuprofen as needed. Take cyclobenzaprine as needed. Medication will make you drowsy, so no driving after taking medication. Consider xray and physical therapy if no improvement.    Return to clinic in 1-2 weeks if no i

## 2019-11-06 ENCOUNTER — TELEPHONE (OUTPATIENT)
Dept: FAMILY MEDICINE CLINIC | Facility: CLINIC | Age: 49
End: 2019-11-06

## 2019-11-06 NOTE — TELEPHONE ENCOUNTER
currently uses [facility]   but test strips he picked up yesterday were for a different machine  -  does he need to change or can you resent RX for Acctino to [facility] / [facility] - call him back

## 2019-11-06 NOTE — TELEPHONE ENCOUNTER
Dr. Riky Quezada sent in a script for just glucose blood in vitro strip. It was not for a specific type or brand. Patient advised to contact pharmacy and tell them what happened and see if they will exchange the strips for the kind he needs.  Patient to call back

## 2019-11-18 RX ORDER — GABAPENTIN 300 MG/1
CAPSULE ORAL
Qty: 90 CAPSULE | Refills: 0 | Status: SHIPPED | OUTPATIENT
Start: 2019-11-18 | End: 2020-03-18

## 2019-11-18 RX ORDER — LISINOPRIL 20 MG/1
TABLET ORAL
Qty: 90 TABLET | Refills: 0 | Status: SHIPPED | OUTPATIENT
Start: 2019-11-18 | End: 2020-05-26

## 2019-11-18 RX ORDER — HYDROCHLOROTHIAZIDE 12.5 MG/1
TABLET ORAL
Qty: 90 TABLET | Refills: 0 | Status: SHIPPED | OUTPATIENT
Start: 2019-11-18 | End: 2020-03-18

## 2019-12-12 ENCOUNTER — TELEPHONE (OUTPATIENT)
Dept: FAMILY MEDICINE CLINIC | Facility: CLINIC | Age: 49
End: 2019-12-12

## 2019-12-12 DIAGNOSIS — E11.8 TYPE 2 DIABETES MELLITUS WITH COMPLICATION, WITHOUT LONG-TERM CURRENT USE OF INSULIN (HCC): Primary | ICD-10-CM

## 2019-12-12 NOTE — TELEPHONE ENCOUNTER
Please inform patient that his hemoglobin A1c was 8.1 last time. Recommend to schedule just a lab appointment to recheck A1c before end of this month and schedule follow-up appointment with me.   I have placed order, please fax order to [facility].

## 2019-12-20 NOTE — TELEPHONE ENCOUNTER
Patient states that he will go to [facility] and get lab test done and then will set up appt after the first of the year. No other questions at this time.

## 2019-12-26 RX ORDER — SITAGLIPTIN 100 MG/1
TABLET, FILM COATED ORAL
Qty: 90 TABLET | Refills: 0 | Status: SHIPPED | OUTPATIENT
Start: 2019-12-26 | End: 2020-03-18

## 2019-12-26 NOTE — TELEPHONE ENCOUNTER
Patient was called 12/20 and patient is having blood work done before end of the year and then will call back and schedule appt after the first of the year.

## 2019-12-26 NOTE — TELEPHONE ENCOUNTER
Patient is due for an appt. Future appt:     Last Appointment with provider:   9/21/2019; Return in about 3 months (around 12/21/2019) for follow up.      [facility]  [facility]  CHOLESTEROL, TOTAL (mg/dL)   Date Value   09/19/2019

## 2020-01-02 RX ORDER — GLIMEPIRIDE 1 MG/1
1 TABLET ORAL 2 TIMES DAILY
Qty: 60 TABLET | Refills: 0 | Status: SHIPPED | OUTPATIENT
Start: 2020-01-02 | End: 2020-03-18

## 2020-01-02 NOTE — TELEPHONE ENCOUNTER
No future appointments. Patient hasnt gotten his labs done yet so hes waiting to schedule an appt until hes done that.

## 2020-01-29 RX ORDER — GLIMEPIRIDE 1 MG/1
TABLET ORAL
Qty: 60 TABLET | Refills: 0 | OUTPATIENT
Start: 2020-01-29

## 2020-01-29 NOTE — TELEPHONE ENCOUNTER
Future appt:    Last Appointment with provider:   9/21/2019  Last appointment at [facility]:  9/21/2019  CHOLESTEROL, TOTAL (mg/dL)   Date Value   09/19/2019 156     HDL CHOLESTEROL (mg/dL)   Date Value   09/19/2019 45     LDL-CHOLESTEROL (mg/dL (calc))

## 2020-02-26 RX ORDER — HYDROCHLOROTHIAZIDE 12.5 MG/1
TABLET ORAL
Qty: 90 TABLET | Refills: 0 | OUTPATIENT
Start: 2020-02-26

## 2020-02-26 RX ORDER — GABAPENTIN 300 MG/1
CAPSULE ORAL
Qty: 90 CAPSULE | Refills: 0 | OUTPATIENT
Start: 2020-02-26

## 2020-02-26 RX ORDER — LISINOPRIL 20 MG/1
TABLET ORAL
Qty: 90 TABLET | Refills: 0 | OUTPATIENT
Start: 2020-02-26

## 2020-02-26 NOTE — TELEPHONE ENCOUNTER
Patient is overdue for an appt.  for patient to return call. Future appt:     Last Appointment with provider:   9/21/2019;Return in about 3 months (around 12/21/2019) for follow up. Last appointment at [facility]:  9/21/2019  CHOLESTEROL, TOTAL (mg/dL)   Date Value   09/19/2019 156     HDL CHOLESTEROL (mg/dL)   Date Value   09/19/2019 45     LDL-CHOLESTEROL (mg/dL (calc))   Date Value   09/19/2019 91     TRIGLYCERIDES (mg/dL)   Date Value   09/19/2019 102     Lab Results   Component Value Date     (H) 12/30/2017    A1C 8.1 (H) 09/19/2019     Lab Results   Component Value Date    TSH 2.770 12/30/2017    TSHT4 2.46 09/19/2019     Last RF:  11/18/2019    No follow-ups on file.

## 2020-03-18 RX ORDER — GLIMEPIRIDE 1 MG/1
1 TABLET ORAL 2 TIMES DAILY
Qty: 60 TABLET | Refills: 0 | Status: SHIPPED | OUTPATIENT
Start: 2020-03-18 | End: 2020-04-13

## 2020-03-18 RX ORDER — CYCLOBENZAPRINE HCL 5 MG
5 TABLET ORAL 3 TIMES DAILY PRN
Qty: 20 TABLET | Refills: 0 | Status: SHIPPED | OUTPATIENT
Start: 2020-03-18 | End: 2020-05-26

## 2020-03-18 RX ORDER — LISINOPRIL 20 MG/1
TABLET ORAL
Qty: 90 TABLET | Refills: 0 | Status: SHIPPED | OUTPATIENT
Start: 2020-03-18 | End: 2021-05-31

## 2020-03-18 RX ORDER — HYDROCHLOROTHIAZIDE 12.5 MG/1
TABLET ORAL
Qty: 90 TABLET | Refills: 0 | Status: SHIPPED | OUTPATIENT
Start: 2020-03-18 | End: 2020-08-08

## 2020-03-18 RX ORDER — GABAPENTIN 300 MG/1
CAPSULE ORAL
Qty: 90 CAPSULE | Refills: 0 | Status: SHIPPED | OUTPATIENT
Start: 2020-03-18 | End: 2020-12-28

## 2020-03-18 NOTE — TELEPHONE ENCOUNTER
Patient needs refill on all of his medications to [facility] in [facility].

## 2020-03-18 NOTE — TELEPHONE ENCOUNTER
Future appt:     Your appointments     Date & Time Appointment Department [facility])    Apr 25, 2020  8:00 AM CDT New Patient Visit with Luzmaria Alvares MD [address] [place] [address] ([facility])    Please arrive 15 minutes

## 2020-04-13 RX ORDER — GLIMEPIRIDE 1 MG/1
TABLET ORAL
Qty: 60 TABLET | Refills: 2 | Status: SHIPPED | OUTPATIENT
Start: 2020-04-13 | End: 2020-05-11

## 2020-04-13 NOTE — TELEPHONE ENCOUNTER
Future appt:     Your appointments     Date & Time Appointment Department [facility])    May 23, 2020  9:20 AM CDT Exam - Established with Jac Lama, [address] [place] [address], [facility] ([place])            [address] [facility]

## 2020-05-11 RX ORDER — GLIMEPIRIDE 1 MG/1
TABLET ORAL
Qty: 60 TABLET | Refills: 0 | Status: SHIPPED | OUTPATIENT
Start: 2020-05-11 | End: 2020-05-26 | Stop reason: ALTCHOICE

## 2020-05-11 NOTE — TELEPHONE ENCOUNTER
Future appt:     Your appointments     Date & Time Appointment Department [facility])    May 23, 2020  9:20 AM CDT Exam - Established with Adni Renteria, [address], Curly Zhao ([facility])            [address] [facility]

## 2020-05-15 ENCOUNTER — TELEPHONE (OUTPATIENT)
Dept: FAMILY MEDICINE CLINIC | Facility: CLINIC | Age: 50
End: 2020-05-15

## 2020-05-15 NOTE — TELEPHONE ENCOUNTER
Mahi Minor  verbally consents to a Virtual/Telephone Check-In service on 5/15/2020. Patient understands and accepts financial responsibility for any deductible, co-insurance and/or co-pays associated with this service.     Future Appointments   Date

## 2020-05-26 ENCOUNTER — VIRTUAL PHONE E/M (OUTPATIENT)
Dept: FAMILY MEDICINE CLINIC | Facility: CLINIC | Age: 50
End: 2020-05-26

## 2020-05-26 VITALS
WEIGHT: 230 LBS | HEIGHT: 72 IN | BODY MASS INDEX: 31.15 KG/M2 | SYSTOLIC BLOOD PRESSURE: 120 MMHG | DIASTOLIC BLOOD PRESSURE: 87 MMHG

## 2020-05-26 DIAGNOSIS — I10 ESSENTIAL HYPERTENSION: ICD-10-CM

## 2020-05-26 DIAGNOSIS — N52.9 ERECTILE DYSFUNCTION, UNSPECIFIED ERECTILE DYSFUNCTION TYPE: ICD-10-CM

## 2020-05-26 DIAGNOSIS — E11.65 TYPE 2 DIABETES MELLITUS WITH HYPERGLYCEMIA, WITHOUT LONG-TERM CURRENT USE OF INSULIN (HCC): Primary | ICD-10-CM

## 2020-05-26 DIAGNOSIS — E11.42 DIABETIC PERIPHERAL NEUROPATHY (HCC): ICD-10-CM

## 2020-05-26 DIAGNOSIS — M54.2 NECK PAIN ON RIGHT SIDE: ICD-10-CM

## 2020-05-26 DIAGNOSIS — Z87.891 QUIT SMOKING: ICD-10-CM

## 2020-05-26 PROCEDURE — 99442 PHONE E/M BY PHYS 11-20 MIN: CPT | Performed by: FAMILY MEDICINE

## 2020-05-26 RX ORDER — SILDENAFIL 50 MG/1
50 TABLET, FILM COATED ORAL
Qty: 10 TABLET | Refills: 0 | Status: SHIPPED | OUTPATIENT
Start: 2020-05-26 | End: 2020-08-08

## 2020-05-26 RX ORDER — GLIMEPIRIDE 2 MG/1
2 TABLET ORAL 2 TIMES DAILY
Qty: 180 TABLET | Refills: 0 | Status: SHIPPED | OUTPATIENT
Start: 2020-05-26 | End: 2020-08-10

## 2020-05-26 RX ORDER — BUPROPION HYDROCHLORIDE 150 MG/1
150 TABLET, EXTENDED RELEASE ORAL 2 TIMES DAILY
Qty: 180 TABLET | Refills: 0 | Status: SHIPPED | OUTPATIENT
Start: 2020-05-26 | End: 2021-02-20

## 2020-05-26 RX ORDER — CYCLOBENZAPRINE HCL 5 MG
5 TABLET ORAL NIGHTLY PRN
Qty: 15 TABLET | Refills: 0 | Status: SHIPPED | OUTPATIENT
Start: 2020-05-26 | End: 2021-04-20

## 2020-05-26 NOTE — PROGRESS NOTES
Chika Chacon  verbally consents to a Virtual/Telephone Check-In service on 5/26/2020. Patient understands and accepts financial responsibility for any deductible, co-insurance and/or co-pays associated with this service.     Duration of the service: 12 wheezing, cough or sputum. GASTROINTESTINAL:  Denies abdominal pain, nausea, vomiting, constipation, diarrhea, or blood in stool. : See HPI  MUSCULOSKELETAL:  Denies weakness, muscle aches, back pain, joint pain, swelling or stiffness.   NEUROLOGICAL: S by mouth 2 (two) times daily.  -     Sildenafil Citrate 50 MG Oral Tab; Take 1 tablet (50 mg total) by mouth daily as needed for Erectile Dysfunction.       -Patient's blood pressure is stable with medication. His diabetes is not very well controlled.   Re

## 2020-05-26 NOTE — PATIENT INSTRUCTIONS
-Patient's blood pressure is stable with medication. His diabetes is not very well controlled. Recommend strict low-carb diet, exercise and weight loss. Continue with metformin, increase glimepiride 2 mg twice a day.   Recommend to schedule lab as soon a

## 2020-06-18 ENCOUNTER — TELEPHONE (OUTPATIENT)
Dept: FAMILY MEDICINE CLINIC | Facility: CLINIC | Age: 50
End: 2020-06-18

## 2020-06-18 NOTE — TELEPHONE ENCOUNTER
Patient is needing medication refill     - Glimepiride 2 mg     Sent to [facility] in [place]

## 2020-06-18 NOTE — TELEPHONE ENCOUNTER
Called and spoke with [facility] pharmacy. Patient never picked up rx from 5/26/20. Pharmacy will get the rx ready for patient. Left detailed message for patient.

## 2020-06-18 NOTE — TELEPHONE ENCOUNTER
Pharmacy told patient refill too soon on Glimepiride due to patient taking more than pills given. Rx needs to be rewritten with the correct dosage in order for patient to get refill.

## 2020-06-18 NOTE — TELEPHONE ENCOUNTER
Called pharmacy and patient cant  rx until 6/19 because of his insurance. Patient is calling stating that he is going out of town tomorrow early and wont be able to  prescription.  Patient was taking 2 tablets of the 2 mg glimepiride twice

## 2020-07-16 ENCOUNTER — TELEPHONE (OUTPATIENT)
Dept: FAMILY MEDICINE CLINIC | Facility: CLINIC | Age: 50
End: 2020-07-16

## 2020-07-16 DIAGNOSIS — E11.65 TYPE 2 DIABETES MELLITUS WITH HYPERGLYCEMIA, WITHOUT LONG-TERM CURRENT USE OF INSULIN (HCC): Primary | ICD-10-CM

## 2020-07-16 NOTE — TELEPHONE ENCOUNTER
RF Metformin    to     [facility]      completely out as of Today         call to confirm please       No future appointments.

## 2020-07-16 NOTE — TELEPHONE ENCOUNTER
Future appt:    Last Appointment with provider:   Visit date not found  [facility]:  Visit date not found  CHOLESTEROL, TOTAL (mg/dL)   Date Value   09/19/2019 156     HDL CHOLESTEROL (mg/dL)   Date Value   09/19/2019 45     LDL-ANAMARIA

## 2020-08-08 DIAGNOSIS — E11.8 TYPE 2 DIABETES MELLITUS WITH COMPLICATION, WITHOUT LONG-TERM CURRENT USE OF INSULIN (HCC): ICD-10-CM

## 2020-08-08 DIAGNOSIS — N52.9 ERECTILE DYSFUNCTION, UNSPECIFIED ERECTILE DYSFUNCTION TYPE: ICD-10-CM

## 2020-08-08 DIAGNOSIS — I10 ESSENTIAL HYPERTENSION: Primary | ICD-10-CM

## 2020-08-08 RX ORDER — HYDROCHLOROTHIAZIDE 12.5 MG/1
TABLET ORAL
Qty: 90 TABLET | Refills: 0 | Status: SHIPPED | OUTPATIENT
Start: 2020-08-08 | End: 2020-11-02

## 2020-08-08 RX ORDER — SILDENAFIL 50 MG/1
50 TABLET, FILM COATED ORAL
Qty: 10 TABLET | Refills: 0 | Status: SHIPPED | OUTPATIENT
Start: 2020-08-08 | End: 2020-12-28

## 2020-08-08 NOTE — TELEPHONE ENCOUNTER
Refill of medications  - Please call into [facility] [place]  -   Sildenafil , Hydrochlorothiazide & Januvia

## 2020-08-08 NOTE — TELEPHONE ENCOUNTER
Patient due for annual exam in August. Called patient and scheduled physical. PATIENT IS OUT OF MEDICATON AND NEEDS REFILL TODAY.      Future Appointments   Date Time Provider Jovanni Mayra   8/29/2020  8:20 AM Ap Lynn MD [facility]

## 2020-08-10 RX ORDER — GLIMEPIRIDE 2 MG/1
TABLET ORAL
Qty: 180 TABLET | Refills: 0 | Status: SHIPPED | OUTPATIENT
Start: 2020-08-10 | End: 2020-12-28

## 2020-08-10 NOTE — TELEPHONE ENCOUNTER
Future appt:     Your appointments     Date & Time Appointment Department [facility])    Aug 29, 2020  8:20 AM CDT Physical - Established with Mirian Rod MD [address] [place], [place] [address]

## 2020-10-14 DIAGNOSIS — E11.65 TYPE 2 DIABETES MELLITUS WITH HYPERGLYCEMIA, WITHOUT LONG-TERM CURRENT USE OF INSULIN (HCC): ICD-10-CM

## 2020-10-14 NOTE — TELEPHONE ENCOUNTER
Future appt:     Your appointments     Date & Time Appointment Department [facility])    Oct 24, 2020  8:00 AM CDT Physical - Established with Arturo Shanks [address] [place] [address] [place] ([place])

## 2020-11-02 DIAGNOSIS — I10 ESSENTIAL HYPERTENSION: ICD-10-CM

## 2020-11-02 RX ORDER — HYDROCHLOROTHIAZIDE 12.5 MG/1
TABLET ORAL
Qty: 90 TABLET | Refills: 0 | Status: SHIPPED | OUTPATIENT
Start: 2020-11-02 | End: 2021-03-15

## 2020-11-02 NOTE — TELEPHONE ENCOUNTER
Future appt:     Your appointments     Date & Time Appointment Department [facility])    Jan 25, 2021  5:00 PM CST Physical - Established with Maritza Modi MD [address] [place] [address]            Rito Magaña

## 2020-12-01 DIAGNOSIS — E11.65 TYPE 2 DIABETES MELLITUS WITH HYPERGLYCEMIA, WITHOUT LONG-TERM CURRENT USE OF INSULIN (HCC): ICD-10-CM

## 2020-12-01 DIAGNOSIS — E11.8 TYPE 2 DIABETES MELLITUS WITH COMPLICATION, WITHOUT LONG-TERM CURRENT USE OF INSULIN (HCC): ICD-10-CM

## 2020-12-01 NOTE — TELEPHONE ENCOUNTER
Metformin:  10/14/20    Future appt:     Your appointments     Date & Time Appointment Department [facility])    Jan 25, 2021  5:00 PM CST Physical - Established with Shalom Lobo MD [address] [place] [address]

## 2020-12-01 NOTE — TELEPHONE ENCOUNTER
Januvia:  8/8/2020    Future appt:     Your appointments     Date & Time Appointment Department [facility])    Jan 25, 2021  5:00 PM CST Physical - Established with Chelsey Umana MD [address] [place] [address]  ([place])

## 2020-12-28 DIAGNOSIS — N52.9 ERECTILE DYSFUNCTION, UNSPECIFIED ERECTILE DYSFUNCTION TYPE: ICD-10-CM

## 2020-12-28 DIAGNOSIS — E11.8 TYPE 2 DIABETES MELLITUS WITH COMPLICATION, WITHOUT LONG-TERM CURRENT USE OF INSULIN (HCC): ICD-10-CM

## 2020-12-28 NOTE — TELEPHONE ENCOUNTER
Future appt:     Your appointments     Date & Time Appointment Department [facility])    Jan 25, 2021  5:00 PM CST Physical - Established with Pamella Butts MD [address] [place] [address]            Abby Ash

## 2020-12-28 NOTE — TELEPHONE ENCOUNTER
Future appt:     Your appointments     Date & Time Appointment Department [facility])    Jan 25, 2021  5:00 PM CST Physical - Established with Mary Cervantes MD [address] [place] [address]            Gracia Hough

## 2020-12-29 DIAGNOSIS — N52.9 ERECTILE DYSFUNCTION, UNSPECIFIED ERECTILE DYSFUNCTION TYPE: ICD-10-CM

## 2020-12-29 RX ORDER — GABAPENTIN 300 MG/1
CAPSULE ORAL
Qty: 30 CAPSULE | Refills: 0 | Status: SHIPPED | OUTPATIENT
Start: 2020-12-29 | End: 2021-03-30

## 2020-12-29 RX ORDER — SILDENAFIL 50 MG/1
50 TABLET, FILM COATED ORAL
Qty: 10 TABLET | Refills: 0 | Status: SHIPPED | OUTPATIENT
Start: 2020-12-29 | End: 2021-02-20

## 2020-12-29 RX ORDER — GLIMEPIRIDE 2 MG/1
2 TABLET ORAL 2 TIMES DAILY
Qty: 60 TABLET | Refills: 0 | Status: SHIPPED | OUTPATIENT
Start: 2020-12-29 | End: 2021-02-13

## 2020-12-30 RX ORDER — GLIMEPIRIDE 2 MG/1
2 TABLET ORAL 2 TIMES DAILY
Qty: 180 TABLET | Refills: 0 | OUTPATIENT
Start: 2020-12-30

## 2020-12-30 RX ORDER — SILDENAFIL 50 MG/1
50 TABLET, FILM COATED ORAL
Qty: 10 TABLET | Refills: 0 | OUTPATIENT
Start: 2020-12-30

## 2020-12-30 RX ORDER — LISINOPRIL 20 MG/1
TABLET ORAL
Qty: 90 TABLET | Refills: 0 | OUTPATIENT
Start: 2020-12-30

## 2020-12-30 RX ORDER — GABAPENTIN 300 MG/1
CAPSULE ORAL
Qty: 90 CAPSULE | Refills: 0 | OUTPATIENT
Start: 2020-12-30

## 2021-02-08 DIAGNOSIS — E11.8 TYPE 2 DIABETES MELLITUS WITH COMPLICATION, WITHOUT LONG-TERM CURRENT USE OF INSULIN (HCC): ICD-10-CM

## 2021-02-08 DIAGNOSIS — E11.65 TYPE 2 DIABETES MELLITUS WITH HYPERGLYCEMIA, WITHOUT LONG-TERM CURRENT USE OF INSULIN (HCC): ICD-10-CM

## 2021-02-08 DIAGNOSIS — I10 ESSENTIAL HYPERTENSION: ICD-10-CM

## 2021-02-08 DIAGNOSIS — N52.9 ERECTILE DYSFUNCTION, UNSPECIFIED ERECTILE DYSFUNCTION TYPE: ICD-10-CM

## 2021-02-08 RX ORDER — GLIMEPIRIDE 2 MG/1
2 TABLET ORAL 2 TIMES DAILY
Qty: 60 TABLET | Refills: 0 | OUTPATIENT
Start: 2021-02-08

## 2021-02-08 RX ORDER — CYCLOBENZAPRINE HCL 5 MG
5 TABLET ORAL NIGHTLY PRN
Qty: 15 TABLET | Refills: 0 | OUTPATIENT
Start: 2021-02-08

## 2021-02-08 RX ORDER — SILDENAFIL 50 MG/1
50 TABLET, FILM COATED ORAL
Qty: 10 TABLET | Refills: 0 | OUTPATIENT
Start: 2021-02-08

## 2021-02-08 RX ORDER — HYDROCHLOROTHIAZIDE 12.5 MG/1
12.5 TABLET ORAL EVERY MORNING
Qty: 90 TABLET | Refills: 0 | OUTPATIENT
Start: 2021-02-08

## 2021-02-08 RX ORDER — GABAPENTIN 300 MG/1
CAPSULE ORAL
Qty: 30 CAPSULE | Refills: 0 | OUTPATIENT
Start: 2021-02-08

## 2021-02-08 NOTE — TELEPHONE ENCOUNTER
Future appt:    Last Appointment with provider:   Telemed:5/26/20(DM)-F/U in 3 months  Last appointment at [facility]:  Visit date not found    cyclobenzaprine 5 MG Oral Tab    15tab  0refill        Filled:5/26/20 Summary: Take 1 tablet (5 mg total) by m

## 2021-02-09 NOTE — TELEPHONE ENCOUNTER
Due to the last 4 physicals being cancel and pt getting scripts filled. Pt will need to be seen by doctor before script should be filled. [employer] message sent as notification.

## 2021-02-15 RX ORDER — GLIMEPIRIDE 2 MG/1
2 TABLET ORAL 2 TIMES DAILY
Qty: 60 TABLET | Refills: 0 | Status: SHIPPED | OUTPATIENT
Start: 2021-02-15 | End: 2021-03-30

## 2021-02-15 NOTE — TELEPHONE ENCOUNTER
Future appt:     Your appointments     Date & Time Appointment Department [facility])    Feb 20, 2021  9:20 AM CST Physical - Established with Cherri Holden MD [address] [place] [address]            Martha Reveles

## 2021-02-20 ENCOUNTER — OFFICE VISIT (OUTPATIENT)
Dept: FAMILY MEDICINE CLINIC | Facility: CLINIC | Age: 51
End: 2021-02-20
Payer: COMMERCIAL

## 2021-02-20 VITALS
WEIGHT: 224 LBS | SYSTOLIC BLOOD PRESSURE: 126 MMHG | TEMPERATURE: 98 F | HEART RATE: 115 BPM | HEIGHT: 74 IN | BODY MASS INDEX: 28.75 KG/M2 | DIASTOLIC BLOOD PRESSURE: 80 MMHG | RESPIRATION RATE: 16 BRPM | OXYGEN SATURATION: 95 %

## 2021-02-20 DIAGNOSIS — F10.10 ALCOHOL ABUSE: ICD-10-CM

## 2021-02-20 DIAGNOSIS — Z12.11 COLON CANCER SCREENING: ICD-10-CM

## 2021-02-20 DIAGNOSIS — E11.42 DIABETIC PERIPHERAL NEUROPATHY (HCC): ICD-10-CM

## 2021-02-20 DIAGNOSIS — F41.9 ANXIETY: ICD-10-CM

## 2021-02-20 DIAGNOSIS — R01.1 CARDIAC MURMUR: ICD-10-CM

## 2021-02-20 DIAGNOSIS — E11.65 TYPE 2 DIABETES MELLITUS WITH HYPERGLYCEMIA, WITHOUT LONG-TERM CURRENT USE OF INSULIN (HCC): ICD-10-CM

## 2021-02-20 DIAGNOSIS — Z00.00 PHYSICAL EXAM: Primary | ICD-10-CM

## 2021-02-20 DIAGNOSIS — N52.9 ERECTILE DYSFUNCTION, UNSPECIFIED ERECTILE DYSFUNCTION TYPE: ICD-10-CM

## 2021-02-20 PROCEDURE — 99396 PREV VISIT EST AGE 40-64: CPT | Performed by: FAMILY MEDICINE

## 2021-02-20 PROCEDURE — 3074F SYST BP LT 130 MM HG: CPT | Performed by: FAMILY MEDICINE

## 2021-02-20 PROCEDURE — 3079F DIAST BP 80-89 MM HG: CPT | Performed by: FAMILY MEDICINE

## 2021-02-20 PROCEDURE — 3008F BODY MASS INDEX DOCD: CPT | Performed by: FAMILY MEDICINE

## 2021-02-20 PROCEDURE — 99213 OFFICE O/P EST LOW 20 MIN: CPT | Performed by: FAMILY MEDICINE

## 2021-02-20 RX ORDER — SILDENAFIL 50 MG/1
50 TABLET, FILM COATED ORAL
Qty: 10 TABLET | Refills: 0 | Status: SHIPPED | OUTPATIENT
Start: 2021-02-20 | End: 2021-03-15

## 2021-02-20 NOTE — PROGRESS NOTES
[address] [place] [address]    Chief Complaint:   Patient presents with:  Physical      HPI:   Kristyn Dewitt is a 48year old male who presents for an Annual Health Visit.    Patient with diabetes type 2, diabetic neuropathy and erectile dysfunction pr Relation Age of Onset   • Diabetes Mother    • Hypertension Mother    • Hypertension Father       SOCIAL HISTORY   Social History    Tobacco Use      Smoking status: Current Every Day Smoker        Packs/day: 0.50      Smokeless tobacco: Never Used    Alco nystagmus  HEENT: normocephalic; normal nose, pharynx. TM's clear, no bulging, no retraction, no fluid, landmarks normal.  NECK: supple; Full ROM; no JVD, no TMG, no carotid bruits  RESPIRATORY: Clear to auscultation bilterally, no rales/rhonchi/wheezing. Take 1 tablet (1,000 mg total) by mouth 2 (two) times daily. Diabetes type 2 not controlled, continue with current medication, strict low-carb diet. Diabetic neuropathy unchanged, continue gabapentin.   Erectile dysfunction unchanged, continue with si

## 2021-02-20 NOTE — PATIENT INSTRUCTIONS
Recommend to quit drinking alcohol.      [address] [place] [address]  Address: [address] [facility] [place], [address] [facility]  Phone: [phone]    Advice low carb in diet, AVOID FOOD WITH HIGH GLYCEMIC INDEX, have smaller portion meal, avoid bread, pasta and potatoes, no juice

## 2021-03-15 DIAGNOSIS — I10 ESSENTIAL HYPERTENSION: ICD-10-CM

## 2021-03-16 RX ORDER — SILDENAFIL 50 MG/1
50 TABLET, FILM COATED ORAL
Qty: 10 TABLET | Refills: 0 | Status: SHIPPED | OUTPATIENT
Start: 2021-03-16 | End: 2021-03-30

## 2021-03-16 RX ORDER — HYDROCHLOROTHIAZIDE 12.5 MG/1
12.5 TABLET ORAL EVERY MORNING
Qty: 90 TABLET | Refills: 0 | Status: SHIPPED | OUTPATIENT
Start: 2021-03-16 | End: 2021-05-31

## 2021-03-16 NOTE — TELEPHONE ENCOUNTER
Future appt:    Last Appointment with provider:   2/20/2021  Last appointment at [facility] [place]:  2/20/2021  CHOLESTEROL, TOTAL (mg/dL)   Date Value   09/19/2019 156     HDL CHOLESTEROL (mg/dL)   Date Value   09/19/2019 45     LDL-CHOLESTEROL (mg/dL (calc))

## 2021-03-30 RX ORDER — GABAPENTIN 300 MG/1
CAPSULE ORAL
Qty: 30 CAPSULE | Refills: 0 | Status: SHIPPED | OUTPATIENT
Start: 2021-03-30 | End: 2021-05-03

## 2021-03-30 RX ORDER — GLIMEPIRIDE 2 MG/1
2 TABLET ORAL 2 TIMES DAILY
Qty: 60 TABLET | Refills: 0 | Status: SHIPPED | OUTPATIENT
Start: 2021-03-30 | End: 2021-05-03

## 2021-03-30 RX ORDER — SILDENAFIL 50 MG/1
50 TABLET, FILM COATED ORAL
Qty: 10 TABLET | Refills: 0 | Status: SHIPPED | OUTPATIENT
Start: 2021-03-30 | End: 2021-05-31

## 2021-03-30 NOTE — TELEPHONE ENCOUNTER
Future appt:    Last Appointment with provider:   2/20/2021  Last appointment at [facility]:  2/20/2021  CHOLESTEROL, TOTAL (mg/dL)   Date Value   09/19/2019 156     HDL CHOLESTEROL (mg/dL)   Date Value   09/19/2019 45     LDL-CHOLESTEROL (mg/dL (calc))

## 2021-04-20 DIAGNOSIS — E11.42 DIABETIC PERIPHERAL NEUROPATHY (HCC): Primary | ICD-10-CM

## 2021-04-20 NOTE — TELEPHONE ENCOUNTER
Future appt:    Last Appointment with provider:   2/20/2021(PX)-F/U in 3 months  Last appointment at [facility]:  2/20/2021    cyclobenzaprine 5 MG Oral Tab    15tab  0refill        Filled:5/26/20 Summary: Take 1 tablet (5 mg total) by mouth nightly as n

## 2021-04-21 RX ORDER — CYCLOBENZAPRINE HCL 5 MG
5 TABLET ORAL NIGHTLY PRN
Qty: 15 TABLET | Refills: 0 | Status: SHIPPED | OUTPATIENT
Start: 2021-04-21 | End: 2021-05-31

## 2021-04-21 RX ORDER — CYCLOBENZAPRINE HCL 5 MG
5 TABLET ORAL NIGHTLY PRN
Qty: 15 TABLET | Refills: 0 | Status: SHIPPED | OUTPATIENT
Start: 2021-04-21 | End: 2021-06-12 | Stop reason: ALTCHOICE

## 2021-04-21 NOTE — TELEPHONE ENCOUNTER
Future appt:     Last Appointment with provider:   2/20/2021- advised Return in about 3 months (around 5/20/2021) for follow up.   Last refill:5/26/20- Cyclobenzaprine      Last appointment at [facility]:  2/20/2021  CHOLESTEROL, TOTAL (mg/dL)   Date Valu

## 2021-04-26 NOTE — TELEPHONE ENCOUNTER
Future appt:     Last Appointment with provider:   2/20/2021; Return in about 3 months (around 5/20/2021) for follow up.     [place] [facility] [place]  2/20/2021  CHOLESTEROL, TOTAL (mg/dL)   Date Value   09/19/2019 156     HDL CHOLESTEROL (mg/dL)

## 2021-05-03 RX ORDER — GABAPENTIN 300 MG/1
CAPSULE ORAL
Qty: 30 CAPSULE | Refills: 0 | Status: SHIPPED | OUTPATIENT
Start: 2021-05-03 | End: 2021-05-31

## 2021-05-03 RX ORDER — GLIMEPIRIDE 2 MG/1
2 TABLET ORAL 2 TIMES DAILY
Qty: 60 TABLET | Refills: 0 | Status: SHIPPED | OUTPATIENT
Start: 2021-05-03 | End: 2021-05-10

## 2021-05-03 NOTE — TELEPHONE ENCOUNTER
Future appt:     Last Appointment with provider:   2/20/2021- advised Return in about 3 months (around 5/20/2021) for follow up.   Last refill: 3/30/21- glimepiride      [place] [facility] [place]:  2/20/2021  CHOLESTEROL, TOTAL (mg/dL)   Date Value

## 2021-05-03 NOTE — TELEPHONE ENCOUNTER
Future appt:     Last Appointment with provider:   2/20/2021- advised Return in about 3 months (around 5/20/2021) for follow up.   Last refill: 3/3/0/21- gabapentin      Last appointment at [facility]:  2/20/2021  CHOLESTEROL, TOTAL (mg/dL)   Date Value

## 2021-05-07 NOTE — TELEPHONE ENCOUNTER
Future appt:     Last Appointment with provider:   2/20/2021-  Advised Return in about 3 months (around 5/20/2021) for follow up.   Last refill: 3/30/21- metformin      Last appointment at [facility] [place]:  2/20/2021  CHOLESTEROL, TOTAL (mg/dL)   Date Value

## 2021-05-11 RX ORDER — GLIMEPIRIDE 2 MG/1
2 TABLET ORAL 2 TIMES DAILY
Qty: 90 TABLET | Refills: 0 | Status: SHIPPED | OUTPATIENT
Start: 2021-05-11 | End: 2021-06-26

## 2021-05-11 RX ORDER — GLIMEPIRIDE 2 MG/1
TABLET ORAL
Qty: 180 TABLET | Refills: 0 | OUTPATIENT
Start: 2021-05-11

## 2021-05-11 NOTE — TELEPHONE ENCOUNTER
Pt has appt coming up and already has a 45 day supply to get him through his appt. Pharmacy will cancel this one.

## 2021-05-11 NOTE — TELEPHONE ENCOUNTER
Future appt:     Last Appointment with provider:   2/20/2021- advised Return in about 3 months (around 5/20/2021) for follow up.   Last refill: 5/3/21- glimepiride    Future Appointments   Date Time Provider Jovanni Nunez   6/12/2021  8:40 AM Bruce Chand

## 2021-05-31 DIAGNOSIS — I10 ESSENTIAL HYPERTENSION: ICD-10-CM

## 2021-05-31 RX ORDER — GABAPENTIN 300 MG/1
CAPSULE ORAL
Qty: 30 CAPSULE | Refills: 0 | Status: SHIPPED | OUTPATIENT
Start: 2021-05-31 | End: 2021-10-30

## 2021-06-02 RX ORDER — CYCLOBENZAPRINE HCL 5 MG
5 TABLET ORAL NIGHTLY PRN
Qty: 15 TABLET | Refills: 0 | Status: SHIPPED | OUTPATIENT
Start: 2021-06-02 | End: 2021-10-30

## 2021-06-02 RX ORDER — LISINOPRIL 20 MG/1
TABLET ORAL
Qty: 90 TABLET | Refills: 0 | Status: SHIPPED | OUTPATIENT
Start: 2021-06-02 | End: 2021-06-26

## 2021-06-02 RX ORDER — HYDROCHLOROTHIAZIDE 12.5 MG/1
12.5 TABLET ORAL EVERY MORNING
Qty: 90 TABLET | Refills: 0 | Status: SHIPPED | OUTPATIENT
Start: 2021-06-02 | End: 2021-06-26

## 2021-06-02 RX ORDER — SILDENAFIL 50 MG/1
50 TABLET, FILM COATED ORAL
Qty: 10 TABLET | Refills: 0 | Status: SHIPPED | OUTPATIENT
Start: 2021-06-02 | End: 2021-08-03

## 2021-06-02 NOTE — TELEPHONE ENCOUNTER
Future appt:     Your appointments     Date & Time Appointment Department [facility])    Jun 12, 2021  8:40 AM CDT Follow Up Visit with Katie Beaver MD [address] [facility], [facility] ([place]) [facility]

## 2021-06-09 ENCOUNTER — TELEPHONE (OUTPATIENT)
Dept: FAMILY MEDICINE CLINIC | Facility: CLINIC | Age: 51
End: 2021-06-09

## 2021-06-09 NOTE — TELEPHONE ENCOUNTER
Pt request lab orders be faxed to [address] [place] [address] in [place]/ [address] [place] [address], Fax [phone]. Lab orders faxed/confirmation received.

## 2021-06-11 PROCEDURE — 3052F HG A1C>EQUAL 8.0%<EQUAL 9.0%: CPT | Performed by: FAMILY MEDICINE

## 2021-06-12 ENCOUNTER — OFFICE VISIT (OUTPATIENT)
Dept: FAMILY MEDICINE CLINIC | Facility: CLINIC | Age: 51
End: 2021-06-12
Payer: COMMERCIAL

## 2021-06-12 VITALS
HEIGHT: 73.75 IN | SYSTOLIC BLOOD PRESSURE: 122 MMHG | OXYGEN SATURATION: 93 % | WEIGHT: 228.63 LBS | TEMPERATURE: 96 F | DIASTOLIC BLOOD PRESSURE: 72 MMHG | HEART RATE: 96 BPM | BODY MASS INDEX: 29.65 KG/M2 | RESPIRATION RATE: 18 BRPM

## 2021-06-12 DIAGNOSIS — F10.10 ALCOHOL ABUSE: ICD-10-CM

## 2021-06-12 DIAGNOSIS — K76.0 FATTY METAMORPHOSIS OF LIVER: ICD-10-CM

## 2021-06-12 DIAGNOSIS — I10 ESSENTIAL HYPERTENSION: ICD-10-CM

## 2021-06-12 DIAGNOSIS — E11.65 TYPE 2 DIABETES MELLITUS WITH HYPERGLYCEMIA, WITHOUT LONG-TERM CURRENT USE OF INSULIN (HCC): Primary | ICD-10-CM

## 2021-06-12 DIAGNOSIS — E11.42 DIABETIC PERIPHERAL NEUROPATHY (HCC): ICD-10-CM

## 2021-06-12 DIAGNOSIS — R01.1 CARDIAC MURMUR: ICD-10-CM

## 2021-06-12 DIAGNOSIS — F41.9 ANXIETY: ICD-10-CM

## 2021-06-12 PROCEDURE — 3074F SYST BP LT 130 MM HG: CPT | Performed by: FAMILY MEDICINE

## 2021-06-12 PROCEDURE — 3078F DIAST BP <80 MM HG: CPT | Performed by: FAMILY MEDICINE

## 2021-06-12 PROCEDURE — 3008F BODY MASS INDEX DOCD: CPT | Performed by: FAMILY MEDICINE

## 2021-06-12 PROCEDURE — 99214 OFFICE O/P EST MOD 30 MIN: CPT | Performed by: FAMILY MEDICINE

## 2021-06-12 RX ORDER — ESCITALOPRAM OXALATE 10 MG/1
10 TABLET ORAL DAILY
Qty: 30 TABLET | Refills: 2 | Status: SHIPPED | OUTPATIENT
Start: 2021-06-12 | End: 2021-08-03

## 2021-06-12 RX ORDER — INSULIN GLARGINE 100 [IU]/ML
10 INJECTION, SOLUTION SUBCUTANEOUS NIGHTLY
Qty: 3 ML | Refills: 2 | Status: SHIPPED | OUTPATIENT
Start: 2021-06-12 | End: 2021-10-30

## 2021-06-12 NOTE — PROGRESS NOTES
[address]  PROGRESS NOTE  Chief Complaint:   Patient presents with:   Follow - Up      HPI:   This is a 48year old male with diabetes type 2, hypertension, diabetic neuropathy, cardiac murmur, anxiety and fatty liver presents for follow Dispense Refill   • insulin glargine (LANTUS SOLOSTAR) 100 UNIT/ML Subcutaneous Solution Pen-injector Inject 10 Units into the skin nightly. 3 mL 2   • escitalopram 10 MG Oral Tab Take 1 tablet (10 mg total) by mouth daily.  30 tablet 2   • lisinopril 20 MG muscle aches, back pain, joint pain, swelling or stiffness. NEUROLOGICAL:  Denies headache, dizziness, syncope,change in bowel or bladder control. See HPI  HEMATOLOGIC:  Denies anemia, bleeding or bruising.   PSYCHIATRIC: See HPI  ENDOCRINOLOGIC:  Denies hypertension    Diabetic peripheral neuropathy (HCC)    Cardiac murmur    Alcohol abuse    Anxiety    Fatty metamorphosis of liver    Other orders  -     insulin glargine (LANTUS SOLOSTAR) 100 UNIT/ML Subcutaneous Solution Pen-injector;  Inject 10 Units int murmur     Alcohol abuse     Vesta Blount MD    This note was created utilizing Dragon speech recognition software.  Please excuse any grammatical errors. Call my office if you have any questions regarding this note.

## 2021-06-12 NOTE — PATIENT INSTRUCTIONS
Labs reviewed. a1c high. Continue current medications  Start lantus insulin. Stop alcohol use.    Advice low carb in diet, AVOID FOOD WITH HIGH GLYCEMIC INDEX, have smaller portion meal, avoid bread, pasta and potatoes, no juice or soda, don't eat lat

## 2021-06-14 ENCOUNTER — TELEPHONE (OUTPATIENT)
Dept: FAMILY MEDICINE CLINIC | Facility: CLINIC | Age: 51
End: 2021-06-14

## 2021-06-15 RX ORDER — INSULIN GLARGINE 100 [IU]/ML
10 INJECTION, SOLUTION SUBCUTANEOUS NIGHTLY
Qty: 9 ML | Refills: 1 | Status: SHIPPED | OUTPATIENT
Start: 2021-06-15 | End: 2021-10-25

## 2021-06-17 ENCOUNTER — TELEPHONE (OUTPATIENT)
Dept: FAMILY MEDICINE CLINIC | Facility: CLINIC | Age: 51
End: 2021-06-17

## 2021-06-17 RX ORDER — PEN NEEDLE, DIABETIC 32GX 5/32"
1 NEEDLE, DISPOSABLE MISCELLANEOUS DAILY
Qty: 1 EACH | Refills: 0 | Status: SHIPPED | OUTPATIENT
Start: 2021-06-17 | End: 2021-09-25

## 2021-06-17 NOTE — TELEPHONE ENCOUNTER
Pt states he filled script for insulin but did not receive needles. Pt is out of town and would like a script sent to [facility] [id number] / [place], [place] / phone [phone].

## 2021-06-26 DIAGNOSIS — I10 ESSENTIAL HYPERTENSION: ICD-10-CM

## 2021-06-26 RX ORDER — GLIMEPIRIDE 2 MG/1
2 TABLET ORAL 2 TIMES DAILY
Qty: 180 TABLET | Refills: 0 | Status: SHIPPED | OUTPATIENT
Start: 2021-06-26 | End: 2021-09-29

## 2021-06-26 RX ORDER — HYDROCHLOROTHIAZIDE 12.5 MG/1
12.5 TABLET ORAL EVERY MORNING
Qty: 90 TABLET | Refills: 0 | Status: SHIPPED | OUTPATIENT
Start: 2021-06-26 | End: 2021-09-29

## 2021-06-26 RX ORDER — LISINOPRIL 20 MG/1
TABLET ORAL
Qty: 90 TABLET | Refills: 0 | Status: SHIPPED | OUTPATIENT
Start: 2021-06-26 | End: 2021-09-29

## 2021-06-26 NOTE — TELEPHONE ENCOUNTER
Future appt:     Last Appointment with provider:   6/12/2021- advised Return in about 3 months (around 9/12/2021) for follow up.     Last refill: 6/2/21-   hydrochlorothiazide 12.5 mg  Lisinopril 20 mg  5/11/21- glimepiride 2 mg  5/7/12- metformin HCI 1000m

## 2021-06-28 ENCOUNTER — TELEPHONE (OUTPATIENT)
Dept: FAMILY MEDICINE CLINIC | Facility: CLINIC | Age: 51
End: 2021-06-28

## 2021-06-28 DIAGNOSIS — Z79.4 TYPE 2 DIABETES MELLITUS WITHOUT COMPLICATION, WITH LONG-TERM CURRENT USE OF INSULIN (HCC): Primary | ICD-10-CM

## 2021-06-28 DIAGNOSIS — E11.9 TYPE 2 DIABETES MELLITUS WITHOUT COMPLICATION, WITH LONG-TERM CURRENT USE OF INSULIN (HCC): Primary | ICD-10-CM

## 2021-06-28 NOTE — TELEPHONE ENCOUNTER
Note from pharmacy, requesting pt testing needs and DX code. Spoke to pt stated he tests 3 times a day. Updated information.

## 2021-08-04 NOTE — TELEPHONE ENCOUNTER
Future appt:    Last Appointment with provider:   6/12/2021  [facility] [place] [facility] [place]:  6/12/2021  CHOLESTEROL, TOTAL (mg/dL)   Date Value   06/11/2021 164     HDL CHOLESTEROL (mg/dL)   Date Value   06/11/2021 46     LDL-CHOLESTEROL (mg/dL (calc))

## 2021-08-05 RX ORDER — ESCITALOPRAM OXALATE 10 MG/1
10 TABLET ORAL DAILY
Qty: 90 TABLET | Refills: 0 | Status: SHIPPED | OUTPATIENT
Start: 2021-08-05 | End: 2021-11-09

## 2021-08-05 RX ORDER — SILDENAFIL 50 MG/1
50 TABLET, FILM COATED ORAL
Qty: 10 TABLET | Refills: 0 | Status: SHIPPED | OUTPATIENT
Start: 2021-08-05 | End: 2022-01-10

## 2021-09-27 RX ORDER — PEN NEEDLE, DIABETIC 32GX 5/32"
1 NEEDLE, DISPOSABLE MISCELLANEOUS DAILY
Qty: 1 EACH | Refills: 0 | Status: SHIPPED | OUTPATIENT
Start: 2021-09-27 | End: 2022-01-10

## 2021-09-29 DIAGNOSIS — I10 ESSENTIAL HYPERTENSION: ICD-10-CM

## 2021-09-29 RX ORDER — HYDROCHLOROTHIAZIDE 12.5 MG/1
TABLET ORAL
Qty: 90 TABLET | Refills: 0 | Status: SHIPPED | OUTPATIENT
Start: 2021-09-29 | End: 2021-11-09

## 2021-09-29 RX ORDER — GLIMEPIRIDE 2 MG/1
TABLET ORAL
Qty: 180 TABLET | Refills: 0 | Status: SHIPPED | OUTPATIENT
Start: 2021-09-29 | End: 2022-01-10

## 2021-09-29 RX ORDER — LISINOPRIL 20 MG/1
TABLET ORAL
Qty: 90 TABLET | Refills: 0 | Status: SHIPPED | OUTPATIENT
Start: 2021-09-29

## 2021-09-29 NOTE — TELEPHONE ENCOUNTER
Future appt:     Last Appointment with provider:   6/12/2021- advised Return in about 3 months (around 9/12/2021) for follow up.       Last refill: 6/26/21-  Metformin  Lisinopril  Glimepiride  Hydrochlorothiazide    Future Appointments   Date Time Provider

## 2021-10-25 NOTE — TELEPHONE ENCOUNTER
Future appt:     Your appointments     Date & Time Appointment Department [facility])    Oct 30, 2021  9:20 AM CDT Follow Up Visit with Ap Lynn MD [address] [place] [address])            [employer]

## 2021-10-26 RX ORDER — INSULIN GLARGINE 100 [IU]/ML
10 INJECTION, SOLUTION SUBCUTANEOUS NIGHTLY
Qty: 9 ML | Refills: 1 | Status: SHIPPED | OUTPATIENT
Start: 2021-10-26 | End: 2021-12-01

## 2021-10-30 ENCOUNTER — OFFICE VISIT (OUTPATIENT)
Dept: FAMILY MEDICINE CLINIC | Facility: CLINIC | Age: 51
End: 2021-10-30
Payer: COMMERCIAL

## 2021-10-30 VITALS
HEIGHT: 73.75 IN | HEART RATE: 94 BPM | DIASTOLIC BLOOD PRESSURE: 84 MMHG | WEIGHT: 222 LBS | OXYGEN SATURATION: 97 % | BODY MASS INDEX: 28.8 KG/M2 | SYSTOLIC BLOOD PRESSURE: 128 MMHG | RESPIRATION RATE: 18 BRPM | TEMPERATURE: 98 F

## 2021-10-30 DIAGNOSIS — E11.8 TYPE 2 DIABETES MELLITUS WITH COMPLICATION, WITHOUT LONG-TERM CURRENT USE OF INSULIN (HCC): Primary | ICD-10-CM

## 2021-10-30 DIAGNOSIS — E78.00 HYPERCHOLESTEROLEMIA: ICD-10-CM

## 2021-10-30 DIAGNOSIS — Z12.11 COLON CANCER SCREENING: ICD-10-CM

## 2021-10-30 DIAGNOSIS — E11.42 DIABETIC PERIPHERAL NEUROPATHY (HCC): ICD-10-CM

## 2021-10-30 DIAGNOSIS — M54.2 NECK PAIN ON RIGHT SIDE: ICD-10-CM

## 2021-10-30 DIAGNOSIS — F41.9 ANXIETY: ICD-10-CM

## 2021-10-30 PROCEDURE — 3008F BODY MASS INDEX DOCD: CPT | Performed by: FAMILY MEDICINE

## 2021-10-30 PROCEDURE — 99214 OFFICE O/P EST MOD 30 MIN: CPT | Performed by: FAMILY MEDICINE

## 2021-10-30 PROCEDURE — 3074F SYST BP LT 130 MM HG: CPT | Performed by: FAMILY MEDICINE

## 2021-10-30 PROCEDURE — 3079F DIAST BP 80-89 MM HG: CPT | Performed by: FAMILY MEDICINE

## 2021-10-30 RX ORDER — CYCLOBENZAPRINE HCL 5 MG
5 TABLET ORAL NIGHTLY PRN
Qty: 15 TABLET | Refills: 0 | Status: SHIPPED | OUTPATIENT
Start: 2021-10-30 | End: 2022-01-10

## 2021-10-30 RX ORDER — GABAPENTIN 300 MG/1
CAPSULE ORAL
Qty: 30 CAPSULE | Refills: 2 | Status: SHIPPED | OUTPATIENT
Start: 2021-10-30 | End: 2022-01-10

## 2021-10-30 NOTE — PATIENT INSTRUCTIONS
Continue current medications  Check labs at [facility].   Schedule colonoscopy.      Advice low carb in diet, AVOID FOOD WITH HIGH GLYCEMIC INDEX, have smaller portion meal, avoid bread, pasta and potatoes, no juice or soda, don't eat late at night, exercise,  a

## 2021-10-30 NOTE — PROGRESS NOTES
[address]  PROGRESS NOTE  Chief Complaint:   Patient presents with:   Follow - Up      HPI:   This is a 46year old male with diabetes type 2, anxiety, hypercholesterolemia and diabetic peripheral neuropathy presents for follow-up and me tablet (5 mg total) by mouth nightly as needed for Muscle spasms. 15 tablet 0   • insulin glargine (BASAGLAR KWIKPEN) 100 UNIT/ML Subcutaneous Solution Pen-injector Inject 10 Units into the skin nightly.  9 mL 1   • HYDROCHLOROTHIAZIDE 12.5 MG Oral Tab TAKE bladder control. HEMATOLOGIC:  Denies anemia, bleeding or bruising. PSYCHIATRIC:  Denies depression or anxiety. ENDOCRINOLOGIC:  Denies excessive sweating, cold or heat intolerance, polyuria or polydipsia.       EXAM:   /84   Pulse 94   Temp 98 °F (14)  -     HEMOGLOBIN A1C    Anxiety    Neck pain on right side  -     gabapentin 300 MG Oral Cap; TAKE 1 CAPSULE(300 MG) BY MOUTH EVERY NIGHT- pt reported taking as needed  -     cyclobenzaprine 5 MG Oral Tab;  Take 1 tablet (5 mg total) by mouth nightly insulin ([address]     Essential hypertension     Diabetic peripheral neuropathy (HCC)     Erectile dysfunction     Cardiac murmur     Alcohol abuse     Anxiety     Type 2 diabetes mellitus (HCC)      Tate Hall MD    This note was created utilizing Dragon s

## 2021-11-09 DIAGNOSIS — I10 ESSENTIAL HYPERTENSION: ICD-10-CM

## 2021-11-09 RX ORDER — HYDROCHLOROTHIAZIDE 12.5 MG/1
TABLET ORAL
Qty: 90 TABLET | Refills: 0 | Status: SHIPPED | OUTPATIENT
Start: 2021-11-09 | End: 2022-01-10

## 2021-11-09 RX ORDER — ESCITALOPRAM OXALATE 10 MG/1
TABLET ORAL
Qty: 90 TABLET | Refills: 0 | Status: SHIPPED | OUTPATIENT
Start: 2021-11-09 | End: 2022-01-10

## 2021-11-09 NOTE — TELEPHONE ENCOUNTER
Future appt:    Last Appointment with provider:   10/30/2021  Last appointment at [facility]:  10/30/2021- pt return advised in 4 months    hydrochlorothiazide refilled on 9/29/21 for #90, 0 refills  Escitalopram refilled on 8/5/21 for #90, 0 refills  [facility]

## 2021-11-17 ENCOUNTER — OFFICE VISIT (OUTPATIENT)
Dept: FAMILY MEDICINE CLINIC | Facility: CLINIC | Age: 51
End: 2021-11-17
Payer: COMMERCIAL

## 2021-11-17 VITALS
TEMPERATURE: 98 F | HEART RATE: 108 BPM | OXYGEN SATURATION: 98 % | DIASTOLIC BLOOD PRESSURE: 88 MMHG | SYSTOLIC BLOOD PRESSURE: 136 MMHG

## 2021-11-17 DIAGNOSIS — R52 BODY ACHES: ICD-10-CM

## 2021-11-17 DIAGNOSIS — R50.9 FEVER, UNSPECIFIED FEVER CAUSE: Primary | ICD-10-CM

## 2021-11-17 DIAGNOSIS — R05.9 COUGH: ICD-10-CM

## 2021-11-17 PROCEDURE — 3079F DIAST BP 80-89 MM HG: CPT | Performed by: NURSE PRACTITIONER

## 2021-11-17 PROCEDURE — 3075F SYST BP GE 130 - 139MM HG: CPT | Performed by: NURSE PRACTITIONER

## 2021-11-17 PROCEDURE — 99213 OFFICE O/P EST LOW 20 MIN: CPT | Performed by: NURSE PRACTITIONER

## 2021-11-17 NOTE — PATIENT INSTRUCTIONS
Covid test pending. Quarantine until results. Continue to watch blood sugars.    Take acetaminophen or ibuprofen for fever/discomfort  Drink plenty of fluids, warm liquids  Antihistamines for congestion  Expectorant and/or cough suppressant  Use saline dr

## 2021-11-17 NOTE — PROGRESS NOTES
HPI:    Patient ID: Tai March is a 46year old male. HPI     Respiratory Clinic    Patient is present with feeling sick after a family function on Saturday and then got sick on Sunday. Other family members Covid negative.    Fever and chills, cough, 100 MG Oral Tab TAKE 1 TABLET(100 MG) BY MOUTH DAILY 90 tablet 0   • Glucose Blood In Vitro Strip Testing blood sugar 3 times a day DX code: E11.9 100 each 11     Allergies:No Known Allergies   PHYSICAL EXAM:      11/17/21  1643   BP: 136/88   Pulse: 108 Placed This Encounter      COVID-19 Testing by PCR Emilie) [E]      Meds This Visit:  Requested Prescriptions      No prescriptions requested or ordered in this encounter       Imaging & Referrals:  None      Patient Instructions   Covid test pending.  Jami Gonzalez

## 2021-11-19 ENCOUNTER — TELEPHONE (OUTPATIENT)
Dept: FAMILY MEDICINE CLINIC | Facility: CLINIC | Age: 51
End: 2021-11-19

## 2021-11-19 NOTE — TELEPHONE ENCOUNTER
----- Message from WILLIAM Espinoza sent at 11/19/2021 11:03 AM CST -----  Please let patient know the Covid test is negative. Thank you.

## 2021-12-01 RX ORDER — INSULIN GLARGINE 100 [IU]/ML
10 INJECTION, SOLUTION SUBCUTANEOUS NIGHTLY
Qty: 9 ML | Refills: 1 | Status: SHIPPED | OUTPATIENT
Start: 2021-12-01 | End: 2022-01-10

## 2021-12-01 NOTE — TELEPHONE ENCOUNTER
Refills sent.   Please remind patient to get his labs done from Dr. Teagan Smith appointment in October through [facility].

## 2021-12-01 NOTE — TELEPHONE ENCOUNTER
Future appt:     Last Appointment with provider:   10/30/2021; Return in about 4 months (around 2/28/2022) for physical.    Last appointment at [facility] [place]:  11/17/2021  CHOLESTEROL, TOTAL (mg/dL)   Date Value   06/11/2021 164     HDL CHOLESTEROL (mg/dL)

## 2022-01-10 DIAGNOSIS — I10 ESSENTIAL HYPERTENSION: ICD-10-CM

## 2022-01-10 DIAGNOSIS — M54.2 NECK PAIN ON RIGHT SIDE: ICD-10-CM

## 2022-01-10 RX ORDER — ESCITALOPRAM OXALATE 10 MG/1
10 TABLET ORAL DAILY
Qty: 90 TABLET | Refills: 0 | Status: SHIPPED | OUTPATIENT
Start: 2022-01-10

## 2022-01-10 RX ORDER — PEN NEEDLE, DIABETIC 32GX 5/32"
1 NEEDLE, DISPOSABLE MISCELLANEOUS DAILY
Qty: 1 EACH | Refills: 0 | Status: SHIPPED | OUTPATIENT
Start: 2022-01-10 | End: 2023-01-10

## 2022-01-10 RX ORDER — HYDROCHLOROTHIAZIDE 12.5 MG/1
12.5 TABLET ORAL DAILY
Qty: 90 TABLET | Refills: 0 | Status: SHIPPED | OUTPATIENT
Start: 2022-01-10

## 2022-01-10 RX ORDER — SILDENAFIL 50 MG/1
50 TABLET, FILM COATED ORAL
Qty: 10 TABLET | Refills: 0 | Status: SHIPPED | OUTPATIENT
Start: 2022-01-10

## 2022-01-10 RX ORDER — GABAPENTIN 300 MG/1
CAPSULE ORAL
Qty: 90 CAPSULE | Refills: 0 | Status: SHIPPED | OUTPATIENT
Start: 2022-01-10

## 2022-01-10 RX ORDER — INSULIN GLARGINE 100 [IU]/ML
10 INJECTION, SOLUTION SUBCUTANEOUS NIGHTLY
Qty: 9 ML | Refills: 0 | Status: SHIPPED | OUTPATIENT
Start: 2022-01-10

## 2022-01-10 RX ORDER — CYCLOBENZAPRINE HCL 5 MG
5 TABLET ORAL NIGHTLY PRN
Qty: 15 TABLET | Refills: 0 | Status: SHIPPED | OUTPATIENT
Start: 2022-01-10

## 2022-01-10 RX ORDER — GLIMEPIRIDE 2 MG/1
2 TABLET ORAL 2 TIMES DAILY
Qty: 180 TABLET | Refills: 0 | Status: SHIPPED | OUTPATIENT
Start: 2022-01-10

## 2022-01-10 NOTE — TELEPHONE ENCOUNTER
Future appt:     Last Appointment with provider:   10/30/2021; Return in about 4 months (around 2/28/2022) for physical.    [place] [facility] [place]:  11/17/2021  CHOLESTEROL, TOTAL (mg/dL)   Date Value   06/11/2021 164     HDL CHOLESTEROL (mg/dL)

## 2022-02-14 ENCOUNTER — TELEMEDICINE (OUTPATIENT)
Dept: FAMILY MEDICINE CLINIC | Facility: CLINIC | Age: 52
End: 2022-02-14

## 2022-02-14 VITALS — WEIGHT: 225 LBS | BODY MASS INDEX: 29.19 KG/M2 | HEIGHT: 73.75 IN

## 2022-02-14 DIAGNOSIS — R11.2 NAUSEA AND VOMITING, UNSPECIFIED VOMITING TYPE: ICD-10-CM

## 2022-02-14 DIAGNOSIS — R05.9 COUGH: ICD-10-CM

## 2022-02-14 DIAGNOSIS — R53.83 FATIGUE, UNSPECIFIED TYPE: Primary | ICD-10-CM

## 2022-02-14 PROCEDURE — 99214 OFFICE O/P EST MOD 30 MIN: CPT | Performed by: NURSE PRACTITIONER

## 2022-02-14 PROCEDURE — 3008F BODY MASS INDEX DOCD: CPT | Performed by: NURSE PRACTITIONER

## 2022-02-14 RX ORDER — ONDANSETRON HYDROCHLORIDE 8 MG/1
8 TABLET, FILM COATED ORAL EVERY 8 HOURS PRN
Qty: 12 TABLET | Refills: 0 | Status: SHIPPED | OUTPATIENT
Start: 2022-02-14 | End: 2022-03-03

## 2022-02-14 NOTE — PATIENT INSTRUCTIONS
Self isolate until results available  Push fluids, rest  Check your glucose level routinely and monitor for high or low levels.     Zofran 8mg every 8 hours as needed for nausea  [employer] foods, if able  Take tylenol 650mg once now  Cool compresses to forehead and back of neck  Follow up in office tomorrow at 1:00    If symptoms worsen, ER precautions

## 2022-02-15 ENCOUNTER — OFFICE VISIT (OUTPATIENT)
Dept: FAMILY MEDICINE CLINIC | Facility: CLINIC | Age: 52
End: 2022-02-15
Payer: COMMERCIAL

## 2022-02-15 VITALS
RESPIRATION RATE: 18 BRPM | SYSTOLIC BLOOD PRESSURE: 122 MMHG | HEART RATE: 102 BPM | TEMPERATURE: 98 F | HEIGHT: 73.75 IN | WEIGHT: 225 LBS | BODY MASS INDEX: 29.19 KG/M2 | OXYGEN SATURATION: 99 % | DIASTOLIC BLOOD PRESSURE: 78 MMHG

## 2022-02-15 DIAGNOSIS — R11.2 NAUSEA AND VOMITING, UNSPECIFIED VOMITING TYPE: ICD-10-CM

## 2022-02-15 DIAGNOSIS — R53.83 FATIGUE, UNSPECIFIED TYPE: Primary | ICD-10-CM

## 2022-02-15 DIAGNOSIS — R05.9 COUGH: ICD-10-CM

## 2022-02-15 PROCEDURE — 87637 SARSCOV2&INF A&B&RSV AMP PRB: CPT | Performed by: NURSE PRACTITIONER

## 2022-02-15 PROCEDURE — 99214 OFFICE O/P EST MOD 30 MIN: CPT | Performed by: NURSE PRACTITIONER

## 2022-02-15 PROCEDURE — 3074F SYST BP LT 130 MM HG: CPT | Performed by: NURSE PRACTITIONER

## 2022-02-15 PROCEDURE — 3008F BODY MASS INDEX DOCD: CPT | Performed by: NURSE PRACTITIONER

## 2022-02-15 PROCEDURE — 3078F DIAST BP <80 MM HG: CPT | Performed by: NURSE PRACTITIONER

## 2022-02-15 NOTE — PATIENT INSTRUCTIONS
Flu and covid today  Continue symptomatic treatment as you've been doing  Fluid, rest  Prone positioning, deep breathing exercises   Await covid testing

## 2022-02-16 ENCOUNTER — TELEPHONE (OUTPATIENT)
Dept: FAMILY MEDICINE CLINIC | Facility: CLINIC | Age: 52
End: 2022-02-16

## 2022-02-16 LAB
FLUAV + FLUBV RNA SPEC NAA+PROBE: NEGATIVE
FLUAV + FLUBV RNA SPEC NAA+PROBE: NEGATIVE
RSV RNA SPEC NAA+PROBE: NEGATIVE
SARS-COV-2 RNA RESP QL NAA+PROBE: DETECTED

## 2022-02-16 NOTE — TELEPHONE ENCOUNTER
Patient informed of the below results and recommendations. Patient states he is still feeling \"down and out\" but is feeling a lot better than over the weekend. Patient c/o occasionally SOB. Patient denies having a cough or fever. Patient states he hasn't gotten a pulse ox yet but will have someone pick him up one from [facility]. Patient anticipates returning to work on Monday. Would like letter sent through [address]

## 2022-02-16 NOTE — TELEPHONE ENCOUNTER
Letter sent. Continue symptomatic treatment as previously outlined, notify office if symptoms worsen. ER precautions for severe symptoms.

## 2022-02-16 NOTE — TELEPHONE ENCOUNTER
----- Message from WILLIAM Patel sent at 2/16/2022 12:51 PM CST -----  Please call the patient. He is indeed positive for covid 19. Continue treatment as outlined at video and visit yesterday. He does not need to test negative to return to work as he can potentially test positive for up to three months even though he would no longer be contagious. I can write return to work note, when does he anticipate returning?

## 2022-02-17 NOTE — TELEPHONE ENCOUNTER
Per Teresita Crawford, continue reaching out to patient until he receives her recommendations. 4th message left for patient to return call.

## 2022-02-22 RX ORDER — ESCITALOPRAM OXALATE 10 MG/1
10 TABLET ORAL DAILY
Qty: 90 TABLET | Refills: 0 | OUTPATIENT
Start: 2022-02-22

## 2022-02-22 RX ORDER — GABAPENTIN 300 MG/1
CAPSULE ORAL
Qty: 90 CAPSULE | Refills: 0 | OUTPATIENT
Start: 2022-02-22

## 2022-02-22 RX ORDER — GLIMEPIRIDE 2 MG/1
2 TABLET ORAL 2 TIMES DAILY
Qty: 180 TABLET | Refills: 0 | OUTPATIENT
Start: 2022-02-22

## 2022-02-22 RX ORDER — INSULIN GLARGINE 100 [IU]/ML
10 INJECTION, SOLUTION SUBCUTANEOUS NIGHTLY
Qty: 9 ML | Refills: 0 | OUTPATIENT
Start: 2022-02-22

## 2022-02-22 RX ORDER — HYDROCHLOROTHIAZIDE 12.5 MG/1
12.5 TABLET ORAL DAILY
Qty: 90 TABLET | Refills: 0 | OUTPATIENT
Start: 2022-02-22

## 2022-02-22 NOTE — TELEPHONE ENCOUNTER
Future appt:     Last Appointment with provider:   10/30/2021- advised Return in about 4 months (around 2/28/2022) for physical.      Last refill: 9/29/21- lisinopril     CHOLESTEROL, TOTAL (mg/dL)   Date Value   06/11/2021 164     HDL CHOLESTEROL (mg/dL)   Date Value   06/11/2021 46     LDL-CHOLESTEROL (mg/dL (calc))   Date Value   06/11/2021 87     TRIGLYCERIDES (mg/dL)   Date Value   06/11/2021 215 (H)     Lab Results   Component Value Date     (H) 12/30/2017    A1C 8.8 (H) 06/11/2021     Lab Results   Component Value Date    TSH 2.770 12/30/2017    TSHT4 2.17 06/11/2021       No follow-ups on file.

## 2022-02-22 NOTE — TELEPHONE ENCOUNTER
Spoke to pt, 8 of the scripts requested were filled 1/10/22 for 90 days. Pt stated that he only got 3 days worth. Informed pt will contact pharmacy to see what happened with the scripts.

## 2022-02-24 RX ORDER — LISINOPRIL 20 MG/1
20 TABLET ORAL DAILY
Qty: 90 TABLET | Refills: 0 | OUTPATIENT
Start: 2022-02-24

## 2022-03-02 ENCOUNTER — TELEPHONE (OUTPATIENT)
Dept: FAMILY MEDICINE CLINIC | Facility: CLINIC | Age: 52
End: 2022-03-02

## 2022-03-02 NOTE — TELEPHONE ENCOUNTER
Needs note to return to work today. Patient states he did not receive letter on 2/16/22. See previous my chart messages.

## 2022-03-02 NOTE — TELEPHONE ENCOUNTER
Spoke with patient. States he did not receive the letter from 2/16/2022 from Farhat Frazier. States he didn't see it on his MyChart. Patient asked me to read the letter to him. After reading the letter, patient asked if the date could be changed stating he can return to work today instead? Informed patient per [employer] messages with Farhat Frazier today, she cannot change the date or write a new letter; an appt is needed. Patient said \"OK\" and asked that I leave a copy of the letter at the  for him to . Letter dated 2/16/2022 left at the  for patient to  as requested.

## 2022-03-03 ENCOUNTER — TELEMEDICINE (OUTPATIENT)
Dept: FAMILY MEDICINE CLINIC | Facility: CLINIC | Age: 52
End: 2022-03-03
Payer: COMMERCIAL

## 2022-03-03 VITALS — BODY MASS INDEX: 29.19 KG/M2 | HEIGHT: 73.75 IN | WEIGHT: 225 LBS

## 2022-03-03 DIAGNOSIS — Z76.89 RETURN TO WORK EVALUATION: ICD-10-CM

## 2022-03-03 DIAGNOSIS — U07.1 COVID-19: Primary | ICD-10-CM

## 2022-03-03 PROCEDURE — 99213 OFFICE O/P EST LOW 20 MIN: CPT | Performed by: NURSE PRACTITIONER

## 2022-03-03 PROCEDURE — 3008F BODY MASS INDEX DOCD: CPT | Performed by: NURSE PRACTITIONER

## 2022-05-14 DIAGNOSIS — M54.2 NECK PAIN ON RIGHT SIDE: ICD-10-CM

## 2022-05-14 NOTE — TELEPHONE ENCOUNTER
My chart message sent to pt to schedule needed px appt. Last appt; 10/30/21- advised Return in about 4 months (around 2/28/2022) for physical.      CHOLESTEROL, TOTAL (mg/dL)   Date Value   06/11/2021 164     HDL CHOLESTEROL (mg/dL)   Date Value   06/11/2021 46     LDL-CHOLESTEROL (mg/dL (calc))   Date Value   06/11/2021 87     TRIGLYCERIDES (mg/dL)   Date Value   06/11/2021 215 (H)     Lab Results   Component Value Date     (H) 12/30/2017    A1C 8.8 (H) 06/11/2021     Lab Results   Component Value Date    TSH 2.770 12/30/2017    TSHT4 2.17 06/11/2021       No follow-ups on file.

## 2022-05-17 RX ORDER — ESCITALOPRAM OXALATE 10 MG/1
TABLET ORAL
Qty: 90 TABLET | Refills: 0 | OUTPATIENT
Start: 2022-05-17

## 2022-05-17 RX ORDER — CYCLOBENZAPRINE HCL 5 MG
TABLET ORAL
Qty: 15 TABLET | Refills: 0 | OUTPATIENT
Start: 2022-05-17

## 2022-05-17 RX ORDER — PEN NEEDLE, DIABETIC 32GX 5/32"
NEEDLE, DISPOSABLE MISCELLANEOUS
Qty: 100 EACH | Refills: 0 | OUTPATIENT
Start: 2022-05-17

## 2022-06-27 DIAGNOSIS — M54.2 NECK PAIN ON RIGHT SIDE: ICD-10-CM

## 2022-06-27 NOTE — TELEPHONE ENCOUNTER
Future appt:     Last Appointment with provider:  10/30/2021; Return in about 4 months (around 2/28/2022) for physical.    Last appointment at [facility]:  2/15/2022  CHOLESTEROL, TOTAL (mg/dL)   Date Value   06/11/2021 164     HDL CHOLESTEROL (mg/dL)   Date Value   06/11/2021 46     LDL-CHOLESTEROL (mg/dL (calc))   Date Value   06/11/2021 87     TRIGLYCERIDES (mg/dL)   Date Value   06/11/2021 215 (H)     Lab Results   Component Value Date     (H) 12/30/2017    A1C 8.8 (H) 06/11/2021     Lab Results   Component Value Date    TSH 2.770 12/30/2017    TSHT4 2.17 06/11/2021       No follow-ups on file.

## 2022-06-30 RX ORDER — CYCLOBENZAPRINE HCL 5 MG
TABLET ORAL
Qty: 15 TABLET | Refills: 0 | Status: SHIPPED | OUTPATIENT
Start: 2022-06-30

## 2022-06-30 RX ORDER — GLIMEPIRIDE 2 MG/1
TABLET ORAL
Qty: 180 TABLET | Refills: 0 | Status: SHIPPED | OUTPATIENT
Start: 2022-06-30

## 2022-06-30 RX ORDER — SILDENAFIL 50 MG/1
TABLET, FILM COATED ORAL
Qty: 10 TABLET | Refills: 0 | Status: SHIPPED | OUTPATIENT
Start: 2022-06-30

## 2022-06-30 NOTE — TELEPHONE ENCOUNTER
Pt called and scheduled appt for the soonest Saturday opening with Kristy or Dr. Ayaka Reyes. Your appointments     Date & Time Appointment Department [facility])    Aug 13, 2022  9:30 AM CDT Physical - Established with WILLIAM Man [address] [facility] ([facility])            [address] [place] [address]  [phone]        Please advise on refills.

## 2022-08-04 DIAGNOSIS — I10 ESSENTIAL HYPERTENSION: ICD-10-CM

## 2022-08-04 DIAGNOSIS — E11.8 TYPE 2 DIABETES MELLITUS WITH COMPLICATION, WITHOUT LONG-TERM CURRENT USE OF INSULIN (HCC): Primary | ICD-10-CM

## 2022-08-04 RX ORDER — LISINOPRIL 20 MG/1
TABLET ORAL
Qty: 90 TABLET | Refills: 0 | Status: CANCELLED | OUTPATIENT
Start: 2022-08-04

## 2022-08-04 RX ORDER — SITAGLIPTIN 100 MG/1
TABLET, FILM COATED ORAL
Qty: 90 TABLET | Refills: 0 | Status: CANCELLED | OUTPATIENT
Start: 2022-08-04

## 2022-08-05 DIAGNOSIS — I10 ESSENTIAL HYPERTENSION: ICD-10-CM

## 2022-08-05 RX ORDER — LISINOPRIL 20 MG/1
TABLET ORAL
Qty: 30 TABLET | Refills: 0 | Status: SHIPPED | OUTPATIENT
Start: 2022-08-05

## 2022-08-05 RX ORDER — LISINOPRIL 20 MG/1
TABLET ORAL
Qty: 90 TABLET | Refills: 0 | OUTPATIENT
Start: 2022-08-05

## 2022-08-05 RX ORDER — PEN NEEDLE, DIABETIC 32GX 5/32"
NEEDLE, DISPOSABLE MISCELLANEOUS
Qty: 100 EACH | Refills: 0 | Status: SHIPPED | OUTPATIENT
Start: 2022-08-05

## 2022-08-05 NOTE — TELEPHONE ENCOUNTER
Future appt: Your appointments     Date & Time Appointment Department [facility])    Aug 13, 2022  9:30 AM CDT Physical - Established with WILLIAM Baptiste [address] [facility], [facility] [place]            [address] [facility]  [facility] by [facility] [phone]  [phone]        Last Appointment with provider:   10/30/21(DM)-F/U 4 mo  Last appointment at [facility]:  2/15/2022  CHOLESTEROL, TOTAL (mg/dL)   Date Value   06/11/2021 164     HDL CHOLESTEROL (mg/dL)   Date Value   06/11/2021 46     LDL-CHOLESTEROL (mg/dL (calc))   Date Value   06/11/2021 87     TRIGLYCERIDES (mg/dL)   Date Value   06/11/2021 215 (H)     Lab Results   Component Value Date     (H) 12/30/2017    A1C 8.8 (H) 06/11/2021     Lab Results   Component Value Date    TSH 2.770 12/30/2017    TSHT4 2.17 06/11/2021       No follow-ups on file.

## 2022-08-13 ENCOUNTER — OFFICE VISIT (OUTPATIENT)
Dept: FAMILY MEDICINE CLINIC | Facility: CLINIC | Age: 52
End: 2022-08-13
Payer: COMMERCIAL

## 2022-08-13 VITALS
OXYGEN SATURATION: 97 % | DIASTOLIC BLOOD PRESSURE: 82 MMHG | RESPIRATION RATE: 18 BRPM | WEIGHT: 215 LBS | HEART RATE: 89 BPM | SYSTOLIC BLOOD PRESSURE: 132 MMHG | HEIGHT: 73 IN | TEMPERATURE: 98 F | BODY MASS INDEX: 28.49 KG/M2

## 2022-08-13 DIAGNOSIS — E11.42 DIABETIC PERIPHERAL NEUROPATHY (HCC): ICD-10-CM

## 2022-08-13 DIAGNOSIS — Z23 NEED FOR VACCINATION: ICD-10-CM

## 2022-08-13 DIAGNOSIS — F10.10 ALCOHOL ABUSE: ICD-10-CM

## 2022-08-13 DIAGNOSIS — R01.1 CARDIAC MURMUR: ICD-10-CM

## 2022-08-13 DIAGNOSIS — Z00.00 ENCOUNTER FOR ANNUAL HEALTH EXAMINATION: Primary | ICD-10-CM

## 2022-08-13 DIAGNOSIS — Z12.5 ENCOUNTER FOR SCREENING FOR MALIGNANT NEOPLASM OF PROSTATE: ICD-10-CM

## 2022-08-13 DIAGNOSIS — I10 ESSENTIAL HYPERTENSION: ICD-10-CM

## 2022-08-13 DIAGNOSIS — N52.9 ERECTILE DYSFUNCTION, UNSPECIFIED ERECTILE DYSFUNCTION TYPE: ICD-10-CM

## 2022-08-13 DIAGNOSIS — E11.8 TYPE 2 DIABETES MELLITUS WITH COMPLICATION, WITHOUT LONG-TERM CURRENT USE OF INSULIN (HCC): ICD-10-CM

## 2022-08-13 DIAGNOSIS — F41.9 ANXIETY: ICD-10-CM

## 2022-08-13 DIAGNOSIS — Z12.11 ENCOUNTER FOR SCREENING FOR MALIGNANT NEOPLASM OF COLON: ICD-10-CM

## 2022-08-13 DIAGNOSIS — Z72.0 CURRENT TOBACCO USE: ICD-10-CM

## 2022-08-13 PROCEDURE — 3075F SYST BP GE 130 - 139MM HG: CPT | Performed by: NURSE PRACTITIONER

## 2022-08-13 PROCEDURE — 90471 IMMUNIZATION ADMIN: CPT | Performed by: NURSE PRACTITIONER

## 2022-08-13 PROCEDURE — 90677 PCV20 VACCINE IM: CPT | Performed by: NURSE PRACTITIONER

## 2022-08-13 PROCEDURE — 3008F BODY MASS INDEX DOCD: CPT | Performed by: NURSE PRACTITIONER

## 2022-08-13 PROCEDURE — 99396 PREV VISIT EST AGE 40-64: CPT | Performed by: NURSE PRACTITIONER

## 2022-08-13 PROCEDURE — 3079F DIAST BP 80-89 MM HG: CPT | Performed by: NURSE PRACTITIONER

## 2022-08-13 NOTE — PATIENT INSTRUCTIONS
Fasting labs at [address]  Will be blood and urine  Get your eyes dilated to check for diabetes side effects in the eyes next week as scheduled; have them fax results to [phone]  Pneumonia vaccine today  Shingles vaccination and covid booster through pharmacy  Dr. Dash Matthews for colonoscopy for colon cancer screening  Jimbo Geronimo, counselor for hypnotherapy for help with smoking cessation. Continue with decreasing alcohol use.   Follow up in 3-6 months for med check pending lab results

## 2022-09-27 DIAGNOSIS — E11.8 TYPE 2 DIABETES MELLITUS WITH COMPLICATION, WITHOUT LONG-TERM CURRENT USE OF INSULIN (HCC): ICD-10-CM

## 2022-09-27 DIAGNOSIS — I10 ESSENTIAL HYPERTENSION: ICD-10-CM

## 2022-09-28 RX ORDER — ESCITALOPRAM OXALATE 10 MG/1
10 TABLET ORAL DAILY
Qty: 90 TABLET | Refills: 1 | Status: SHIPPED | OUTPATIENT
Start: 2022-09-28

## 2022-09-28 RX ORDER — LISINOPRIL 20 MG/1
TABLET ORAL
Qty: 90 TABLET | Refills: 1 | Status: SHIPPED | OUTPATIENT
Start: 2022-09-28

## 2022-09-28 NOTE — TELEPHONE ENCOUNTER
Last appt 10/30/21- advised Return in about 4 months (around 2/28/2022) for physical.     8/13/22 advised Return in about 6 months (around 2/13/2023) for Med check. Future appt:    Last Appointment with provider:   Visit date not found  Last appointment at [facility]:  8/13/2022  CHOLESTEROL, TOTAL (mg/dL)   Date Value   06/11/2021 164     HDL CHOLESTEROL (mg/dL)   Date Value   06/11/2021 46     LDL-CHOLESTEROL (mg/dL (calc))   Date Value   06/11/2021 87     TRIGLYCERIDES (mg/dL)   Date Value   06/11/2021 215 (H)     Lab Results   Component Value Date     (H) 12/30/2017    A1C 8.8 (H) 06/11/2021     Lab Results   Component Value Date    TSH 2.770 12/30/2017    TSHT4 2.17 06/11/2021       No follow-ups on file.

## 2023-01-17 RX ORDER — GLIMEPIRIDE 2 MG/1
TABLET ORAL
Qty: 180 TABLET | Refills: 0 | Status: SHIPPED | OUTPATIENT
Start: 2023-01-17

## 2023-01-17 RX ORDER — INSULIN GLARGINE 100 [IU]/ML
INJECTION, SOLUTION SUBCUTANEOUS
Qty: 9 ML | Refills: 0 | Status: SHIPPED | OUTPATIENT
Start: 2023-01-17

## 2023-01-17 NOTE — TELEPHONE ENCOUNTER
Last appt 10/31/21 advised Return in about 4 months (around 2/28/2022) for physical.  Future Appointments   Date Time Provider Jovanni Nunez   2/11/2023 11:00 AM Holli Douglas MD [facility]       Future appt:    Last Appointment with provider:   Visit date not found  Last appointment at [facility]:  8/13/2022  CHOLESTEROL, TOTAL (mg/dL)   Date Value   06/11/2021 164     HDL CHOLESTEROL (mg/dL)   Date Value   06/11/2021 46     LDL-CHOLESTEROL (mg/dL (calc))   Date Value   06/11/2021 87     TRIGLYCERIDES (mg/dL)   Date Value   06/11/2021 215 (H)     Lab Results   Component Value Date     (H) 12/30/2017    A1C 8.8 (H) 06/11/2021     Lab Results   Component Value Date    TSH 2.770 12/30/2017    TSHT4 2.17 06/11/2021       No follow-ups on file.

## 2023-01-20 ENCOUNTER — TELEMEDICINE (OUTPATIENT)
Dept: FAMILY MEDICINE CLINIC | Facility: CLINIC | Age: 53
End: 2023-01-20
Payer: COMMERCIAL

## 2023-01-20 DIAGNOSIS — R19.7 DIARRHEA, UNSPECIFIED TYPE: ICD-10-CM

## 2023-01-20 DIAGNOSIS — R10.11 RUQ PAIN: Primary | ICD-10-CM

## 2023-01-20 PROCEDURE — 99214 OFFICE O/P EST MOD 30 MIN: CPT

## 2023-01-20 RX ORDER — SIMETHICONE 125 MG
1 CAPSULE ORAL DAILY PRN
Qty: 30 CAPSULE | Refills: 0 | Status: SHIPPED | OUTPATIENT
Start: 2023-01-20 | End: 2023-02-19

## 2023-01-20 NOTE — PATIENT INSTRUCTIONS
Blood work when able    Xray if symptoms worsen or fail to improve over the weekend    Increase fiber intake    Gas-X for intermittent pain.     Continue taking ibuprofen for pain

## 2023-02-08 ENCOUNTER — TELEPHONE (OUTPATIENT)
Dept: FAMILY MEDICINE CLINIC | Facility: CLINIC | Age: 53
End: 2023-02-08

## 2023-02-08 PROCEDURE — 3051F HG A1C>EQUAL 7.0%<8.0%: CPT | Performed by: FAMILY MEDICINE

## 2023-02-08 PROCEDURE — 3060F POS MICROALBUMINURIA REV: CPT | Performed by: FAMILY MEDICINE

## 2023-02-08 PROCEDURE — 3061F NEG MICROALBUMINURIA REV: CPT | Performed by: FAMILY MEDICINE

## 2023-02-08 NOTE — TELEPHONE ENCOUNTER
Wants to make sure his lab orders were sent to [facility] in [place] .  Going there today

## 2023-02-08 NOTE — TELEPHONE ENCOUNTER
Please call [facility] in [address] and make sure they have orders. If not please fax orders.

## 2023-02-08 NOTE — TELEPHONE ENCOUNTER
Was not able to get [facility] in [place] but, I did fax the orders just incase they did not have them.     Please advise

## 2023-02-09 LAB
ABSOLUTE BASOPHILS: 60 CELLS/UL (ref 0–200)
ABSOLUTE EOSINOPHILS: 240 CELLS/UL (ref 15–500)
ABSOLUTE LYMPHOCYTES: 1350 CELLS/UL (ref 850–3900)
ABSOLUTE MONOCYTES: 773 CELLS/UL (ref 200–950)
ABSOLUTE NEUTROPHILS: 5078 CELLS/UL (ref 1500–7800)
ALBUMIN/GLOBULIN RATIO: 1.5 (CALC) (ref 1–2.5)
ALBUMIN: 4.7 G/DL (ref 3.6–5.1)
ALKALINE PHOSPHATASE: 69 U/L (ref 35–144)
ALT: 40 U/L (ref 9–46)
AST: 35 U/L (ref 10–35)
BASOPHILS: 0.8 %
BILIRUBIN, TOTAL: 0.9 MG/DL (ref 0.2–1.2)
BUN: 17 MG/DL (ref 7–25)
CALCIUM: 10.5 MG/DL (ref 8.6–10.3)
CARBON DIOXIDE: 30 MMOL/L (ref 20–32)
CHLORIDE: 94 MMOL/L (ref 98–110)
CHOL/HDLC RATIO: 2.7 (CALC)
CHOLESTEROL, TOTAL: 192 MG/DL
CREATININE, RANDOM URINE: 164 MG/DL (ref 20–320)
CREATININE: 1.03 MG/DL (ref 0.7–1.3)
EGFR: 87 ML/MIN/1.73M2
EOSINOPHILS: 3.2 %
GLOBULIN: 3.2 G/DL (CALC) (ref 1.9–3.7)
GLUCOSE: 133 MG/DL (ref 65–139)
HDL CHOLESTEROL: 72 MG/DL
HEMATOCRIT: 45.3 % (ref 38.5–50)
HEMOGLOBIN A1C: 7 % OF TOTAL HGB
HEMOGLOBIN: 16.4 G/DL (ref 13.2–17.1)
LDL-CHOLESTEROL: 103 MG/DL (CALC)
LYMPHOCYTES: 18 %
MCH: 36.2 PG (ref 27–33)
MCHC: 36.2 G/DL (ref 32–36)
MCV: 100 FL (ref 80–100)
MICROALBUMIN/CREATININE RATIO, RANDOM URINE: 99 MCG/MG CREAT
MICROALBUMIN: 16.3 MG/DL
MONOCYTES: 10.3 %
MPV: 10.5 FL (ref 7.5–12.5)
NEUTROPHILS: 67.7 %
NON-HDL CHOLESTEROL: 120 MG/DL (CALC)
PLATELET COUNT: 183 THOUSAND/UL (ref 140–400)
POTASSIUM: 4.5 MMOL/L (ref 3.5–5.3)
PROTEIN, TOTAL: 7.9 G/DL (ref 6.1–8.1)
RDW: 12.8 % (ref 11–15)
RED BLOOD CELL COUNT: 4.53 MILLION/UL (ref 4.2–5.8)
SODIUM: 136 MMOL/L (ref 135–146)
TOTAL PSA: 0.3 NG/ML
TRIGLYCERIDES: 76 MG/DL
TSH W/REFLEX TO FT4: 2.02 MIU/L (ref 0.4–4.5)
WHITE BLOOD CELL COUNT: 7.5 THOUSAND/UL (ref 3.8–10.8)

## 2023-02-11 ENCOUNTER — OFFICE VISIT (OUTPATIENT)
Dept: FAMILY MEDICINE CLINIC | Facility: CLINIC | Age: 53
End: 2023-02-11
Payer: COMMERCIAL

## 2023-02-11 VITALS
HEART RATE: 96 BPM | SYSTOLIC BLOOD PRESSURE: 128 MMHG | HEIGHT: 73 IN | OXYGEN SATURATION: 99 % | BODY MASS INDEX: 29.12 KG/M2 | TEMPERATURE: 99 F | DIASTOLIC BLOOD PRESSURE: 86 MMHG | WEIGHT: 219.69 LBS | RESPIRATION RATE: 16 BRPM

## 2023-02-11 DIAGNOSIS — F41.9 ANXIETY: ICD-10-CM

## 2023-02-11 DIAGNOSIS — E11.8 TYPE 2 DIABETES MELLITUS WITH COMPLICATION, WITHOUT LONG-TERM CURRENT USE OF INSULIN (HCC): Primary | ICD-10-CM

## 2023-02-11 DIAGNOSIS — R01.1 CARDIAC MURMUR: ICD-10-CM

## 2023-02-11 DIAGNOSIS — I10 ESSENTIAL HYPERTENSION: ICD-10-CM

## 2023-02-11 DIAGNOSIS — E11.42 DIABETIC PERIPHERAL NEUROPATHY (HCC): ICD-10-CM

## 2023-02-11 PROCEDURE — 99214 OFFICE O/P EST MOD 30 MIN: CPT | Performed by: FAMILY MEDICINE

## 2023-02-11 PROCEDURE — 3079F DIAST BP 80-89 MM HG: CPT | Performed by: FAMILY MEDICINE

## 2023-02-11 PROCEDURE — 3008F BODY MASS INDEX DOCD: CPT | Performed by: FAMILY MEDICINE

## 2023-02-11 PROCEDURE — 3074F SYST BP LT 130 MM HG: CPT | Performed by: FAMILY MEDICINE

## 2023-02-11 RX ORDER — ATORVASTATIN CALCIUM 10 MG/1
10 TABLET, FILM COATED ORAL EVERY OTHER DAY
Qty: 45 TABLET | Refills: 1 | Status: SHIPPED | OUTPATIENT
Start: 2023-02-11

## 2023-02-11 RX ORDER — LANCETS
1 EACH MISCELLANEOUS DAILY
Qty: 100 EACH | Refills: 5 | Status: SHIPPED | OUTPATIENT
Start: 2023-02-11 | End: 2024-02-11

## 2023-02-11 RX ORDER — BLOOD-GLUCOSE METER
EACH MISCELLANEOUS
Qty: 1 KIT | Refills: 0 | Status: SHIPPED | OUTPATIENT
Start: 2023-02-11

## 2023-02-11 RX ORDER — HYDROCHLOROTHIAZIDE 12.5 MG/1
12.5 TABLET ORAL DAILY
Qty: 90 TABLET | Refills: 1 | Status: SHIPPED | OUTPATIENT
Start: 2023-02-11

## 2023-02-11 RX ORDER — ESCITALOPRAM OXALATE 10 MG/1
10 TABLET ORAL DAILY
Qty: 90 TABLET | Refills: 1 | Status: SHIPPED | OUTPATIENT
Start: 2023-02-11

## 2023-02-11 NOTE — PATIENT INSTRUCTIONS
Continue current medications. Labs reviewed. A1c 7.0, improving. Calcium slightly elevated. Likely due to hydrochlorothiazide use. Do not use any multivitamins. Lipid panel is normal.   Other labs looks good. Start atorvastatin. Schedule echocardiogram at hospital.     Anxiety and neuropathy stable. Advice low carb in diet, AVOID FOOD WITH HIGH GLYCEMIC INDEX, have smaller portion meal, avoid bread, pasta and potatoes, no juice or soda, don't eat late at night, exercise,  and weight loss. Continue to monitor glucose level, call if glucose level less than 70 or more than 300. Advice low salt diet and exercise. Monitor your blood pressure. Return to clinic if systolic blood pressure more than 148 or diastolic more than 383. Return to clinic if any concern.

## 2023-03-21 DIAGNOSIS — E11.8 TYPE 2 DIABETES MELLITUS WITH COMPLICATION, WITHOUT LONG-TERM CURRENT USE OF INSULIN (HCC): ICD-10-CM

## 2023-03-22 RX ORDER — PEN NEEDLE, DIABETIC 32GX 5/32"
NEEDLE, DISPOSABLE MISCELLANEOUS
Qty: 100 EACH | Refills: 5 | Status: SHIPPED | OUTPATIENT
Start: 2023-03-22

## 2023-03-22 NOTE — TELEPHONE ENCOUNTER
Last appt 2/11/23 advised Return in about 6 months (around 8/11/2023) for physical.    No future appointments.

## 2023-04-13 ENCOUNTER — TELEMEDICINE (OUTPATIENT)
Dept: FAMILY MEDICINE CLINIC | Facility: CLINIC | Age: 53
End: 2023-04-13

## 2023-04-13 DIAGNOSIS — R19.7 DIARRHEA OF PRESUMED INFECTIOUS ORIGIN: ICD-10-CM

## 2023-04-13 DIAGNOSIS — K57.92 DIVERTICULITIS: Primary | ICD-10-CM

## 2023-04-13 DIAGNOSIS — R10.32 LEFT LOWER QUADRANT ABDOMINAL PAIN: ICD-10-CM

## 2023-04-13 PROCEDURE — 99443 PHONE E/M BY PHYS 21-30 MIN: CPT

## 2023-04-13 RX ORDER — AMOXICILLIN AND CLAVULANATE POTASSIUM 875; 125 MG/1; MG/1
1 TABLET, FILM COATED ORAL 2 TIMES DAILY
Qty: 10 TABLET | Refills: 0 | Status: SHIPPED | OUTPATIENT
Start: 2023-04-13 | End: 2023-04-18

## 2023-06-05 RX ORDER — SILDENAFIL 50 MG/1
50 TABLET, FILM COATED ORAL
Qty: 10 TABLET | Refills: 2 | Status: SHIPPED | OUTPATIENT
Start: 2023-06-05

## 2023-06-05 NOTE — TELEPHONE ENCOUNTER
Return in about 6 months (around 8/11/2023) for physical.    Future appt:    Last Appointment with provider:   2/11/2023  Last appointment at [facility]:  2/11/2023  CHOLESTEROL, TOTAL (mg/dL)   Date Value   02/08/2023 192     HDL CHOLESTEROL (mg/dL)   Date Value   02/08/2023 72     LDL-CHOLESTEROL (mg/dL (calc))   Date Value   02/08/2023 103 (H)     TRIGLYCERIDES (mg/dL)   Date Value   02/08/2023 76     Lab Results   Component Value Date     (H) 12/30/2017    A1C 7.0 (H) 02/08/2023     Lab Results   Component Value Date    TSH 2.770 12/30/2017    TSHT4 2.02 02/08/2023       No follow-ups on file.

## 2023-07-10 ENCOUNTER — TELEPHONE (OUTPATIENT)
Dept: FAMILY MEDICINE CLINIC | Facility: CLINIC | Age: 53
End: 2023-07-10

## 2023-07-10 RX ORDER — GLIMEPIRIDE 2 MG/1
2 TABLET ORAL 2 TIMES DAILY
Qty: 180 TABLET | Refills: 0 | Status: SHIPPED | OUTPATIENT
Start: 2023-07-10

## 2023-07-10 NOTE — TELEPHONE ENCOUNTER
My chart message sent to schedule upcoming needed appt      Last appt 2/11/23 advised Return in about 6 months (around 8/11/2023) for physical.      No future appointments.

## 2023-07-10 NOTE — TELEPHONE ENCOUNTER
LM for pt to call back for needed upcoming appt      Last appt 2/1//23 advised Return in about 6 months (around 8/11/2023) for physical.      No future appointments.

## 2023-07-10 NOTE — TELEPHONE ENCOUNTER
Wants his glimepiride and metformin called to [facility] in [facility].  Never gotten from there before

## 2023-07-11 RX ORDER — GLIMEPIRIDE 2 MG/1
2 TABLET ORAL 2 TIMES DAILY
Qty: 180 TABLET | Refills: 0 | Status: SHIPPED | OUTPATIENT
Start: 2023-07-11

## 2023-08-03 DIAGNOSIS — E11.8 TYPE 2 DIABETES MELLITUS WITH COMPLICATION, WITHOUT LONG-TERM CURRENT USE OF INSULIN (HCC): ICD-10-CM

## 2023-08-03 NOTE — TELEPHONE ENCOUNTER
Last appt 2/11/23 advised Return in about 6 months (around 8/11/2023) for physical.      Future Appointments   Date Time Provider Jovanni Nunez   9/9/2023  9:30 AM Cassandra Castaneda MD [facility]

## 2023-09-13 ENCOUNTER — TELEPHONE (OUTPATIENT)
Dept: FAMILY MEDICINE CLINIC | Facility: CLINIC | Age: 53
End: 2023-09-13

## 2023-09-13 DIAGNOSIS — E11.8 TYPE 2 DIABETES MELLITUS WITH COMPLICATION, WITHOUT LONG-TERM CURRENT USE OF INSULIN (HCC): Primary | ICD-10-CM

## 2023-09-13 RX ORDER — INSULIN GLARGINE 100 [IU]/ML
10 INJECTION, SOLUTION SUBCUTANEOUS NIGHTLY
Qty: 15 ML | Refills: 0 | Status: SHIPPED | OUTPATIENT
Start: 2023-09-13

## 2023-09-13 NOTE — TELEPHONE ENCOUNTER
Last appt 2/11/23 advised Return in about 6 months (around 8/11/2023) for physical.      Future Appointments   Date Time Provider Jovanni Nunez   10/12/2023  4:30 PM Kelsey Langley MD [facility]

## 2023-09-27 RX ORDER — ESCITALOPRAM OXALATE 10 MG/1
10 TABLET ORAL DAILY
Qty: 90 TABLET | Refills: 0 | Status: SHIPPED | OUTPATIENT
Start: 2023-09-27

## 2023-09-27 RX ORDER — ATORVASTATIN CALCIUM 10 MG/1
10 TABLET, FILM COATED ORAL EVERY OTHER DAY
Qty: 45 TABLET | Refills: 0 | Status: SHIPPED | OUTPATIENT
Start: 2023-09-27

## 2023-09-27 RX ORDER — HYDROCHLOROTHIAZIDE 12.5 MG/1
12.5 TABLET ORAL DAILY
Qty: 90 TABLET | Refills: 0 | Status: SHIPPED | OUTPATIENT
Start: 2023-09-27

## 2023-09-27 NOTE — TELEPHONE ENCOUNTER
Last appt 2/11/23 advised Return in about 6 months (around 8/11/2023) for physical.      Future Appointments   Date Time Provider Jovanni Nunez   10/12/2023  4:30 PM Marichuy Mims MD [facility]

## 2025-05-29 DIAGNOSIS — H53.9 VISION CHANGES: ICD-10-CM

## 2025-05-29 DIAGNOSIS — R51.9 ACUTE NONINTRACTABLE HEADACHE, UNSPECIFIED HEADACHE TYPE: Primary | ICD-10-CM

## (undated) DIAGNOSIS — I10 ESSENTIAL HYPERTENSION: ICD-10-CM

## (undated) DIAGNOSIS — M54.2 NECK PAIN ON RIGHT SIDE: ICD-10-CM

## (undated) NOTE — LETTER
12/19/19 December 19, 2019    [facility] [id number]      Dear Brie Room:    [place] [address] is making a special effort to contact patients with diabetes who are overdue for certain tests or exams important to diabetes care.    The

## (undated) NOTE — LETTER
Date: 2/15/2022    Patient Name: Julieth Nolan          To Whom it may concern: This letter has been written at the patient's request. The above patient was seen at the [facility] for treatment of a medical condition. Patient being followed by our office for acute illness. Return to work date pending lab results and clinical outcomes.          Sincerely,      WILLIAM Vleez

## (undated) NOTE — LETTER
Date: 2/16/2022    Patient Name: Efrem Barraza          To Whom it may concern: This letter has been written at the patient's request. The above patient was seen at the [facility] for treatment of a medical condition. This patient should be excused from attending work through 02/20/2022. May return to work 02/21/2022.       Sincerely,      WILLIAM Bliss

## (undated) NOTE — LETTER
Date: 11/17/2021    Patient Name: Diego Zamudio          To Whom it may concern: This letter has been written at the patient's request. The above patient was seen at the [facility] for treatment of a medical condition.     This patient shou

## (undated) NOTE — LETTER
01/07/22        Carina Alvarez  [address] [facility]      Dear Brie Room,    [address] [facility] records indicate that you have outstanding lab work and or testing that was ordered for you and has not yet been completed:  Orders Placed This Encounter      Comp M

## (undated) NOTE — LETTER
Date: 3/3/2022    Patient Name: Carol Ann Mabry          To Whom it may concern: This letter has been written at the patient's request. The above patient was seen at the [facility] for treatment of a medical condition. The patient may return to work 03/02/2022 without restrictions.          Sincerely,      WILLIAM Foy

## (undated) NOTE — LETTER
07/08/19        [facility] [place] [facility] [address]      Dear Saumya Jansen,    [address] [facility] records indicate that you have outstanding lab work and or testing that was ordered for you and has not yet been completed:  Orders Placed This Encounter      CBC With

## (undated) NOTE — Clinical Note
06/06/2017        [facility] [id number]      Dear Rafael Garcia,    [address] [facility] records indicate that you have outstanding lab work and or testing that was ordered for you and has not yet been completed:    Microalb/Creat Ratio, Random Urine [E]

## (undated) NOTE — LETTER
05/08/19        [facility] [id number]      Dear Luciano Holden,    [address] [facility] records indicate that you have outstanding lab work and or testing that was ordered for you and has not yet been completed:  Orders Placed This Encounter      Hemoglob

## (undated) NOTE — LETTER
Date: 1/20/2023    Patient Name: Mary Thomas          To Whom it may concern: This letter has been written at the patient's request. The above patient was seen at the [facility] for treatment of a medical condition. This patient should be excused from attending work/school from 1/16/2023 through 1/19/2023    The patient may return to work/school on 1/20/2023 without limitations        Sincerely,  . Ethan Esquivel

## (undated) NOTE — MR AVS SNAPSHOT
[facility] [address] [place] [address],  [address]  [phone]               Thank you for choosing us for your health care visit with Gualberto Huggins MD.  We are glad to serve you and happy to provide you with this summary of yo Cut back on coffee. Discuss quit smoking. Check labs today. Return to clinic in 3 months.         Allergies as of May 06, 2017     No Known Allergies                 Today's Vital Signs     BP Pulse Temp Height Weight BMI    138/96 mmHg 100 98.4 °F (3 Expires: 7/5/2017  8:48 AM    If you have questions, you can call [phone] to talk to our [facility] Staff. Remember, MyChart is NOT to be used for urgent needs. For medical emergencies, dial 911.         Educational Information     Your blood p Tips for increasing your physical activity – Adults who are physically active are less likely to develop some chronic diseases than adults who are inactive.      HOW TO GET STARTED: HOW TO STAY MOTIVATED:   Start activities slowly and build up over time Do

## (undated) NOTE — LETTER
03/22/21        [facility] [phone]      Dear Desmond Ladd,    [address] [facility] records indicate that you have outstanding lab work and or testing that was ordered for you and has not yet been completed:  Orders Placed This Encounter      ERIK Wi

## (undated) NOTE — LETTER
Date: 4/13/2023    Patient Name: Lucero Fernandez          To Whom it may concern: This letter has been written at the patient's request. The above patient was seen at the [facility] for treatment of a medical condition. This patient should be excused from attending work from 4/10/2023 through 4/12/2023. The patient may return to work on 4/13/2023 without limitations. Sincerely,    Ethan Hernandez    .